# Patient Record
Sex: FEMALE | Race: WHITE | NOT HISPANIC OR LATINO | Employment: FULL TIME | URBAN - METROPOLITAN AREA
[De-identification: names, ages, dates, MRNs, and addresses within clinical notes are randomized per-mention and may not be internally consistent; named-entity substitution may affect disease eponyms.]

---

## 2018-02-20 RX ORDER — ATORVASTATIN CALCIUM 20 MG/1
20 TABLET, FILM COATED ORAL
COMMUNITY
Start: 2018-01-22

## 2018-02-20 RX ORDER — BIOTIN 1 MG
1000 TABLET ORAL
COMMUNITY
Start: 2012-07-18

## 2018-02-20 RX ORDER — ALPRAZOLAM 0.25 MG/1
0.25 TABLET ORAL
COMMUNITY
Start: 2015-11-05

## 2018-02-20 RX ORDER — BALSALAZIDE DISODIUM 750 MG/1
2250 CAPSULE ORAL
COMMUNITY
End: 2021-10-06

## 2018-02-20 RX ORDER — ACETAMINOPHEN 500 MG
500 TABLET ORAL EVERY 6 HOURS
COMMUNITY

## 2018-10-01 ENCOUNTER — OFFICE VISIT (OUTPATIENT)
Dept: PODIATRY | Facility: CLINIC | Age: 58
End: 2018-10-01
Payer: COMMERCIAL

## 2018-10-01 VITALS
BODY MASS INDEX: 26.63 KG/M2 | RESPIRATION RATE: 16 BRPM | SYSTOLIC BLOOD PRESSURE: 134 MMHG | DIASTOLIC BLOOD PRESSURE: 74 MMHG | HEART RATE: 74 BPM | WEIGHT: 156 LBS | HEIGHT: 64 IN

## 2018-10-01 DIAGNOSIS — M79.672 PAIN IN BOTH FEET: Primary | ICD-10-CM

## 2018-10-01 DIAGNOSIS — Q66.52 CONGENITAL PES PLANUS OF LEFT FOOT: ICD-10-CM

## 2018-10-01 DIAGNOSIS — M21.969 ACQUIRED DEFORMITY OF FOOT, UNSPECIFIED LATERALITY: ICD-10-CM

## 2018-10-01 DIAGNOSIS — Q66.51 CONGENITAL PES PLANUS OF RIGHT FOOT: ICD-10-CM

## 2018-10-01 DIAGNOSIS — M79.671 PAIN IN BOTH FEET: Primary | ICD-10-CM

## 2018-10-01 PROCEDURE — 73620 X-RAY EXAM OF FOOT: CPT | Performed by: PODIATRIST

## 2018-10-01 PROCEDURE — 20550 NJX 1 TENDON SHEATH/LIGAMENT: CPT | Performed by: PODIATRIST

## 2018-10-01 PROCEDURE — 99203 OFFICE O/P NEW LOW 30 MIN: CPT | Performed by: PODIATRIST

## 2018-10-01 RX ORDER — LOSARTAN POTASSIUM AND HYDROCHLOROTHIAZIDE 12.5; 1 MG/1; MG/1
1 TABLET ORAL
COMMUNITY
Start: 2018-08-02 | End: 2019-08-02

## 2018-10-01 RX ORDER — METOPROLOL TARTRATE 50 MG/1
50 TABLET, FILM COATED ORAL
COMMUNITY
Start: 2018-01-04 | End: 2022-05-31

## 2018-10-01 RX ORDER — GUAIFENESIN 1200 MG/1
TABLET, EXTENDED RELEASE ORAL
COMMUNITY
Start: 2014-11-19

## 2018-10-01 NOTE — PROGRESS NOTES
Assessment/Plan:   Pain  Plantar fasciitis  Pes planus  Plan  X-rays taken  Foot exam performed  Right heel trigger point injection done  1 25 cc of Kenalog 10 was injected without pain or complication  Patient's feet have been casted for custom molded foot orthotics  She will take Tylenol p r n  Gerhardt Sep She will stretch daily  There are no diagnoses linked to this encounter  Subjective:  Patient has complaint of pain in her right heel  No history of trauma  This has been ongoing for several months  Patient ID: Margo Sullivan is a 62 y o  female  No past medical history on file  No past surgical history on file  Allergies   Allergen Reactions    Adhesive [Medical Tape]          Current Outpatient Prescriptions:     Cholecalciferol 1000 units capsule, Take 1,000 Units by mouth, Disp: , Rfl:     Guaifenesin 1200 MG TB12, Take as needed , Disp: , Rfl:     losartan-hydrochlorothiazide (HYZAAR) 100-12 5 MG per tablet, Take 1 tablet by mouth, Disp: , Rfl:     metoprolol tartrate (LOPRESSOR) 50 mg tablet, Take 50 mg by mouth, Disp: , Rfl:     acetaminophen (TYLENOL) 500 mg tablet, Take 500 mg by mouth every 6 (six) hours, Disp: , Rfl:     ALPRAZolam (XANAX) 0 25 mg tablet, Take 0 25 mg by mouth, Disp: , Rfl:     atorvastatin (LIPITOR) 20 mg tablet, Take 20 mg by mouth, Disp: , Rfl:     B Complex Vitamins (B COMPLEX 1 PO), Take 1 tablet by mouth, Disp: , Rfl:     balsalazide (COLAZAL) 750 mg capsule, Take 2,250 mg by mouth, Disp: , Rfl:     Biotin 1000 MCG tablet, Take 1,000 mcg by mouth, Disp: , Rfl:     There is no problem list on file for this patient  HPI    The following portions of the patient's history were reviewed and updated as appropriate: allergies, current medications, past family history, past medical history, past social history, past surgical history and problem list     Review of Systems      Historical Information   No past medical history on file    No past surgical history on file  Social History   History   Alcohol use Not on file     History   Drug use: Unknown     Family History: No family history on file  Meds/Allergies   Allergies   Allergen Reactions    Adhesive [Medical Tape]        Current Outpatient Prescriptions on File Prior to Visit   Medication Sig Dispense Refill    acetaminophen (TYLENOL) 500 mg tablet Take 500 mg by mouth every 6 (six) hours      ALPRAZolam (XANAX) 0 25 mg tablet Take 0 25 mg by mouth      atorvastatin (LIPITOR) 20 mg tablet Take 20 mg by mouth      balsalazide (COLAZAL) 750 mg capsule Take 2,250 mg by mouth      Biotin 1000 MCG tablet Take 1,000 mcg by mouth       No current facility-administered medications on file prior to visit  Objective:  Patient's shoes and socks removed     Foot Exam    General  General Appearance: appears stated age and healthy   Orientation: alert and oriented to person, place, and time   Affect: appropriate   Gait: antalgic       Right Foot/Ankle     Inspection and Palpation  Ecchymosis: none  Tenderness: bony tenderness   Swelling: metatarsals   Arch: pes planus  Hammertoes: fifth toe  Hallux valgus: no  Hallux limitus: yes  Skin Exam: dry skin;     Neurovascular  Dorsalis pedis: 2+  Posterior tibial: 2+  Saphenous nerve sensation: normal  Tibial nerve sensation: normal  Superficial peroneal nerve sensation: normal  Deep peroneal nerve sensation: normal  Sural nerve sensation: normal  Achilles reflex: 2+  Babinski reflex: 2+    Muscle Strength  Ankle dorsiflexion: 4+    Range of Motion    Passive  Ankle dorsiflexion: 5, pain      Tests  PT Tinel's sign: negative    Too many toes: positive       Left Foot/Ankle      Inspection and Palpation  Tenderness: none   Swelling: metatarsals   Arch: pes planus  Hammertoes: fifth toe  Hallux valgus: no  Hallux limitus: yes    Neurovascular  Dorsalis pedis: 2+  Posterior tibial: 2+  Saphenous nerve sensation: normal  Tibial nerve sensation: normal  Superficial peroneal nerve sensation: normal  Deep peroneal nerve sensation: normal  Sural nerve sensation: normal  Achilles reflex: 2+  Babinski reflex: 2+    Muscle Strength  Ankle dorsiflexion: 4+    Range of Motion    Passive  Ankle dorsiflexion: 5      Tests  PT Tinel's sign: negative  Too many toes: positive         Physical Exam   Constitutional: She appears well-developed and well-nourished  Cardiovascular: Normal rate and regular rhythm  Pulses:       Dorsalis pedis pulses are 2+ on the right side, and 2+ on the left side  Posterior tibial pulses are 2+ on the right side, and 2+ on the left side  Musculoskeletal:        Right foot: There is bony tenderness  Pain with palpation plantar fascia insertion  X-ray demonstrates no evidence of fracture however early plantar calcaneal spur noted   Feet:   Right Foot:   Skin Integrity: Positive for dry skin  Neurological:   Reflex Scores:       Achilles reflexes are 2+ on the right side and 2+ on the left side

## 2019-06-13 ENCOUNTER — ANNUAL EXAM (OUTPATIENT)
Dept: OBGYN CLINIC | Facility: CLINIC | Age: 59
End: 2019-06-13
Payer: COMMERCIAL

## 2019-06-13 VITALS
DIASTOLIC BLOOD PRESSURE: 80 MMHG | HEIGHT: 64 IN | WEIGHT: 158 LBS | BODY MASS INDEX: 26.98 KG/M2 | SYSTOLIC BLOOD PRESSURE: 124 MMHG

## 2019-06-13 DIAGNOSIS — Z12.39 BREAST SCREENING, UNSPECIFIED: ICD-10-CM

## 2019-06-13 DIAGNOSIS — Z01.419 ENCOUNTER FOR WELL WOMAN EXAM: Primary | ICD-10-CM

## 2019-06-13 PROCEDURE — 99396 PREV VISIT EST AGE 40-64: CPT | Performed by: PHYSICIAN ASSISTANT

## 2019-06-13 RX ORDER — CALCIUM CARBONATE 200(500)MG
1 TABLET,CHEWABLE ORAL DAILY
COMMUNITY

## 2019-06-13 RX ORDER — DILTIAZEM HYDROCHLORIDE 180 MG/1
180 CAPSULE, COATED, EXTENDED RELEASE ORAL DAILY
COMMUNITY

## 2019-06-13 RX ORDER — FAMOTIDINE 10 MG
10 TABLET ORAL 2 TIMES DAILY
COMMUNITY

## 2019-06-13 RX ORDER — PHENOL 1.4 %
600 AEROSOL, SPRAY (ML) MUCOUS MEMBRANE 2 TIMES DAILY WITH MEALS
COMMUNITY

## 2019-06-13 RX ORDER — ASPIRIN 81 MG/1
81 TABLET ORAL DAILY
COMMUNITY

## 2019-06-13 RX ORDER — CHLORAL HYDRATE 500 MG
CAPSULE ORAL
COMMUNITY

## 2019-06-13 RX ORDER — DIPHENOXYLATE HYDROCHLORIDE AND ATROPINE SULFATE 2.5; .025 MG/1; MG/1
1 TABLET ORAL DAILY
COMMUNITY

## 2019-06-13 RX ORDER — LORATADINE 10 MG/1
10 TABLET ORAL DAILY
COMMUNITY

## 2019-11-25 ENCOUNTER — OFFICE VISIT (OUTPATIENT)
Dept: OBGYN CLINIC | Facility: CLINIC | Age: 59
End: 2019-11-25
Payer: COMMERCIAL

## 2019-11-25 VITALS — SYSTOLIC BLOOD PRESSURE: 118 MMHG | WEIGHT: 155 LBS | DIASTOLIC BLOOD PRESSURE: 80 MMHG | BODY MASS INDEX: 26.61 KG/M2

## 2019-11-25 DIAGNOSIS — R30.9 PAIN WITH URINATION: Primary | ICD-10-CM

## 2019-11-25 DIAGNOSIS — N30.00 ACUTE CYSTITIS WITHOUT HEMATURIA: ICD-10-CM

## 2019-11-25 LAB
SL AMB  POCT GLUCOSE, UA: NEGATIVE
SL AMB LEUKOCYTE ESTERASE,UA: ABNORMAL
SL AMB POCT BILIRUBIN,UA: NEGATIVE
SL AMB POCT BLOOD,UA: ABNORMAL
SL AMB POCT KETONES,UA: NEGATIVE
SL AMB POCT NITRITE,UA: POSITIVE
SL AMB POCT PH,UA: NEGATIVE
SL AMB POCT SPECIFIC GRAVITY,UA: NEGATIVE
SL AMB POCT URINE PROTEIN: NEGATIVE
SL AMB POCT UROBILINOGEN: NEGATIVE

## 2019-11-25 PROCEDURE — 99213 OFFICE O/P EST LOW 20 MIN: CPT | Performed by: PHYSICIAN ASSISTANT

## 2019-11-25 PROCEDURE — 81002 URINALYSIS NONAUTO W/O SCOPE: CPT | Performed by: PHYSICIAN ASSISTANT

## 2019-11-25 RX ORDER — NITROFURANTOIN 25; 75 MG/1; MG/1
100 CAPSULE ORAL 2 TIMES DAILY
Qty: 14 CAPSULE | Refills: 1 | Status: SHIPPED | OUTPATIENT
Start: 2019-11-25 | End: 2019-12-02

## 2019-11-25 RX ORDER — POTASSIUM CHLORIDE 750 MG/1
30 TABLET, EXTENDED RELEASE ORAL 2 TIMES DAILY
COMMUNITY

## 2019-11-25 NOTE — PROGRESS NOTES
Assessment/Plan:    No problem-specific Assessment & Plan notes found for this encounter  Diagnoses and all orders for this visit:    Pain with urination  -     POCT urine dip  -     nitrofurantoin (MACROBID) 100 mg capsule; Take 1 capsule (100 mg total) by mouth 2 (two) times a day for 7 days    Acute cystitis without hematuria  -     nitrofurantoin (MACROBID) 100 mg capsule; Take 1 capsule (100 mg total) by mouth 2 (two) times a day for 7 days    Other orders  -     potassium chloride (K-DUR,KLOR-CON) 10 mEq tablet; Take 30 mEq by mouth 2 (two) times a day          Subjective:      Patient ID: Akbar Ochoa is a 61 y o  female  Pt presents for a problem visit today  She complains of urinary frequency and burning off and on x 2 weeks  Pt has h/o IC--changed diet and sxs impoved, but not 100%  No fever/chills  No hematuria  Some lbp, right >left    UA+  rx macrobid  Increase water, probiotic, cranberry      The following portions of the patient's history were reviewed and updated as appropriate: allergies, current medications, past family history, past medical history, past social history, past surgical history and problem list     Review of Systems   Constitutional: Negative for chills, fever and unexpected weight change  Gastrointestinal: Negative for abdominal pain, blood in stool, constipation and diarrhea  Genitourinary: Positive for dysuria and frequency  Negative for hematuria  Objective:      /80 (BP Location: Right arm, Patient Position: Sitting, Cuff Size: Standard)   Wt 70 3 kg (155 lb)   BMI 26 61 kg/m²          Physical Exam   Constitutional: She appears well-developed and well-nourished  Genitourinary: Vagina normal  Pelvic exam was performed with patient supine  There is no rash or lesion on the right labia  There is no rash or lesion on the left labia  Cervix exhibits no motion tenderness, no discharge and no friability     Lymphadenopathy: No inguinal adenopathy noted on the right or left side  Nursing note and vitals reviewed

## 2019-11-25 NOTE — PROGRESS NOTES
Patient is here with pain with urinating, urgency and frequency  Patient has seen Uro/GYN and was diagnosed with cystitis  She states she is also having very bad kidney pain  She states she has had a history of UTI symptoms, and last saw Dino Nuñez in March  She is also concerned that she has a prolapse, she did have hysterectomy in 2003, but feels pressure

## 2020-06-17 ENCOUNTER — ANNUAL EXAM (OUTPATIENT)
Dept: OBGYN CLINIC | Facility: CLINIC | Age: 60
End: 2020-06-17
Payer: COMMERCIAL

## 2020-06-17 VITALS
WEIGHT: 156 LBS | BODY MASS INDEX: 26.63 KG/M2 | SYSTOLIC BLOOD PRESSURE: 118 MMHG | DIASTOLIC BLOOD PRESSURE: 80 MMHG | HEIGHT: 64 IN

## 2020-06-17 DIAGNOSIS — Z12.31 ENCOUNTER FOR SCREENING MAMMOGRAM FOR BREAST CANCER: ICD-10-CM

## 2020-06-17 DIAGNOSIS — Z78.0 POSTMENOPAUSAL: ICD-10-CM

## 2020-06-17 DIAGNOSIS — Z01.419 ENCOUNTER FOR WELL WOMAN EXAM: Primary | ICD-10-CM

## 2020-06-17 DIAGNOSIS — Z12.39 ENCOUNTER FOR SCREENING BREAST EXAMINATION: ICD-10-CM

## 2020-06-17 PROCEDURE — 99396 PREV VISIT EST AGE 40-64: CPT | Performed by: PHYSICIAN ASSISTANT

## 2021-02-16 DIAGNOSIS — K51.919 ULCERATIVE COLITIS WITH COMPLICATION, UNSPECIFIED LOCATION (HCC): Primary | ICD-10-CM

## 2021-02-16 RX ORDER — BALSALAZIDE DISODIUM 750 MG/1
2250 CAPSULE ORAL 3 TIMES DAILY
Qty: 270 CAPSULE | Refills: 5 | Status: SHIPPED | OUTPATIENT
Start: 2021-02-16 | End: 2021-02-19

## 2021-02-16 RX ORDER — BALSALAZIDE DISODIUM 750 MG/1
2250 CAPSULE ORAL 3 TIMES DAILY
COMMUNITY
End: 2021-02-16 | Stop reason: SDUPTHER

## 2021-02-16 NOTE — TELEPHONE ENCOUNTER
Pt requesting refill on balsalazide disodium 750mg caps  Last office visit was 12-  Please advise   Thank you

## 2021-02-19 DIAGNOSIS — K51.90 ULCERATIVE COLITIS WITHOUT COMPLICATIONS, UNSPECIFIED LOCATION (HCC): Primary | ICD-10-CM

## 2021-02-19 DIAGNOSIS — K51.919 ULCERATIVE COLITIS WITH COMPLICATION, UNSPECIFIED LOCATION (HCC): ICD-10-CM

## 2021-02-19 RX ORDER — BALSALAZIDE DISODIUM 750 MG/1
2250 CAPSULE ORAL 2 TIMES DAILY
Qty: 180 CAPSULE | Refills: 5 | Status: SHIPPED | OUTPATIENT
Start: 2021-02-19 | End: 2021-02-22

## 2021-02-19 RX ORDER — BALSALAZIDE DISODIUM 750 MG/1
CAPSULE ORAL
Qty: 180 CAPSULE | Refills: 2 | OUTPATIENT
Start: 2021-02-19

## 2021-02-22 RX ORDER — BALSALAZIDE DISODIUM 750 MG/1
2250 CAPSULE ORAL 2 TIMES DAILY
Qty: 180 CAPSULE | Refills: 5 | Status: SHIPPED | OUTPATIENT
Start: 2021-02-22 | End: 2021-10-06 | Stop reason: SDUPTHER

## 2021-02-22 NOTE — TELEPHONE ENCOUNTER
Patient left message that you sent the prescription to the wrong pharmacy  [facility]   Thank you

## 2021-03-10 DIAGNOSIS — Z23 ENCOUNTER FOR IMMUNIZATION: ICD-10-CM

## 2021-04-19 ENCOUNTER — TELEPHONE (OUTPATIENT)
Dept: GASTROENTEROLOGY | Facility: CLINIC | Age: 61
End: 2021-04-19

## 2021-04-19 NOTE — TELEPHONE ENCOUNTER
Patient had left message asking for a call to schedule her colonoscopy  I returned her call and had to leave message for her to call back to actually schedule a flip with Dr Pablo Do  He spoke with her in December and she was having issues with gerd and told her she would have an EGD along with her colon when she was due  If she calls back, she has a hx of gerd and ulcerative colitis  Please schedule for a flip with Dr Pablo Do  Thank you!

## 2021-04-20 ENCOUNTER — TELEPHONE (OUTPATIENT)
Dept: GASTROENTEROLOGY | Facility: CLINIC | Age: 61
End: 2021-04-20

## 2021-04-20 NOTE — TELEPHONE ENCOUNTER
Faxed cardiac clearance request to Dr Sohail Dietrich [phone] due to pt having a hx of afib  Will call their office in one week to make sure that clearance request was received [phone]

## 2021-04-23 NOTE — TELEPHONE ENCOUNTER
I spoke to patient and answered all questions regarding upcoming procedure  Flip is scheduled   Thank you

## 2021-05-13 ENCOUNTER — TELEPHONE (OUTPATIENT)
Dept: GASTROENTEROLOGY | Facility: AMBULATORY SURGERY CENTER | Age: 61
End: 2021-05-13

## 2021-05-13 NOTE — TELEPHONE ENCOUNTER
Dr Marquise Mann    I spoke to patient today and she was unsure if she wants the EGD  Can you please review her chart  She said she doesn't recall having a gastric ulcer and she only uses tums maybe once a week if needed    She would like your approval to not have EGD and only the colon    Thanks you Whitney león

## 2021-05-14 RX ORDER — LOSARTAN POTASSIUM AND HYDROCHLOROTHIAZIDE 12.5; 1 MG/1; MG/1
TABLET ORAL
COMMUNITY
Start: 2021-03-08

## 2021-05-14 RX ORDER — UBIDECARENONE 75 MG
CAPSULE ORAL DAILY
COMMUNITY

## 2021-05-17 ENCOUNTER — ANESTHESIA (OUTPATIENT)
Dept: GASTROENTEROLOGY | Facility: AMBULATORY SURGERY CENTER | Age: 61
End: 2021-05-17

## 2021-05-17 ENCOUNTER — ANESTHESIA EVENT (OUTPATIENT)
Dept: GASTROENTEROLOGY | Facility: AMBULATORY SURGERY CENTER | Age: 61
End: 2021-05-17

## 2021-05-17 ENCOUNTER — HOSPITAL ENCOUNTER (OUTPATIENT)
Dept: GASTROENTEROLOGY | Facility: AMBULATORY SURGERY CENTER | Age: 61
Discharge: HOME | End: 2021-05-17
Payer: COMMERCIAL

## 2021-05-17 VITALS
TEMPERATURE: 97.2 F | RESPIRATION RATE: 18 BRPM | WEIGHT: 157 LBS | HEART RATE: 65 BPM | HEIGHT: 64 IN | DIASTOLIC BLOOD PRESSURE: 79 MMHG | BODY MASS INDEX: 26.8 KG/M2 | OXYGEN SATURATION: 100 % | SYSTOLIC BLOOD PRESSURE: 136 MMHG

## 2021-05-17 DIAGNOSIS — K51.919 ULCERATIVE COLITIS WITH COMPLICATION, UNSPECIFIED LOCATION (HCC): ICD-10-CM

## 2021-05-17 PROCEDURE — 00811 ANES LWR INTST NDSC NOS: CPT | Performed by: NURSE ANESTHETIST, CERTIFIED REGISTERED

## 2021-05-17 PROCEDURE — 45385 COLONOSCOPY W/LESION REMOVAL: CPT | Performed by: INTERNAL MEDICINE

## 2021-05-17 PROCEDURE — 88305 TISSUE EXAM BY PATHOLOGIST: CPT | Performed by: PATHOLOGY

## 2021-05-17 PROCEDURE — 45380 COLONOSCOPY AND BIOPSY: CPT | Performed by: INTERNAL MEDICINE

## 2021-05-17 RX ORDER — PROPOFOL 10 MG/ML
INJECTION, EMULSION INTRAVENOUS AS NEEDED
Status: DISCONTINUED | OUTPATIENT
Start: 2021-05-17 | End: 2021-05-17

## 2021-05-17 RX ORDER — LANOLIN ALCOHOL/MO/W.PET/CERES
CREAM (GRAM) TOPICAL
COMMUNITY

## 2021-05-17 RX ORDER — SODIUM CHLORIDE 9 MG/ML
30 INJECTION, SOLUTION INTRAVENOUS CONTINUOUS
Status: DISCONTINUED | OUTPATIENT
Start: 2021-05-17 | End: 2021-05-21 | Stop reason: HOSPADM

## 2021-05-17 RX ADMIN — PROPOFOL 100 MG: 10 INJECTION, EMULSION INTRAVENOUS at 10:46

## 2021-05-17 RX ADMIN — PROPOFOL 50 MG: 10 INJECTION, EMULSION INTRAVENOUS at 10:49

## 2021-05-17 RX ADMIN — SODIUM CHLORIDE: 9 INJECTION, SOLUTION INTRAVENOUS at 10:39

## 2021-05-17 RX ADMIN — PROPOFOL 50 MG: 10 INJECTION, EMULSION INTRAVENOUS at 10:53

## 2021-05-17 RX ADMIN — PROPOFOL 50 MG: 10 INJECTION, EMULSION INTRAVENOUS at 10:48

## 2021-05-17 RX ADMIN — PROPOFOL 30 MG: 10 INJECTION, EMULSION INTRAVENOUS at 10:59

## 2021-05-17 NOTE — DISCHARGE INSTRUCTIONS
Colonoscopy   WHAT YOU NEED TO KNOW:   A colonoscopy is a procedure to examine the inside of your colon (intestine) with a scope  Polyps or tissue growths may have been removed during your colonoscopy  It is normal to feel bloated and to have some abdominal discomfort  You should be passing gas  If you have hemorrhoids or you had polyps removed, you may have a small amount of bleeding  DISCHARGE INSTRUCTIONS:   Seek care immediately if:    You have sudden, severe abdominal pain   You have problems swallowing   You have a large amount of black, sticky bowel movements or blood in your bowel movements   You have sudden trouble breathing   You feel weak, lightheaded, or faint or your heart beats faster than normal for you  Contact your healthcare provider if:    You have a fever and chills   You have nausea or are vomiting   Your abdomen is bloated or feels full and hard   You have abdominal pain   You have black, sticky bowel movements or blood in your bowel movements   You have not had a bowel movement for 3 days after your procedure   You have rash or hives   You have questions or concerns about your procedure  Activity:    Do not lift, strain, or run for 24 hours after your procedure   Rest after your procedure  You have been given medicine to relax you  Do not drive or make important decisions until the day after your procedure  Return to your normal activity as directed   Relieve gas and discomfort from bloating by lying on your right side with a heating pad on your abdomen  You may need to take short walks to help the gas move out  Eat small meals until bloating is relieved  Follow up with your healthcare provider as directed: Write down your questions so you remember to ask them during your visits  If you take a blood thinner, please review the specific instructions from your endoscopist about when you should resume it   These can be found in the Recommendation and Your Medication list sections of this After Visit Summary  High Fiber Diet   WHAT YOU NEED TO KNOW:   What is a high-fiber diet? A high-fiber diet includes foods that have a high amount of fiber  Fiber is the part of fruits, vegetables, and grains that is not broken down by your body  Fiber keeps your bowel movements regular  Fiber can also help lower your cholesterol level, control blood sugar in people with diabetes, and relieve constipation  Fiber can also help you control your weight because it helps you feel full faster  Most adults should eat 25 to 35 grams of fiber each day  Talk to your dietitian or healthcare provider about the amount of fiber you need  What foods are good sources of fiber? · Foods with at least 4 grams of fiber per serving:      ? ? to ½ cup of high-fiber cereal (check the nutrition label on the box)    ? ½ cup of blackberries or raspberries    ? 4 dried prunes    ? 1 cooked artichoke    ? ½ cup of cooked legumes, such as lentils, or red, kidney, and galvan beans    · Foods with 1 to 3 grams of fiber per serving:      ? 1 slice of whole-wheat, pumpernickel, or rye bread    ? ½ cup of cooked brown rice    ? 4 whole-wheat crackers    ? 1 cup of oatmeal    ? ½ cup of cereal with 1 to 3 grams of fiber per serving (check the nutrition label on the box)    ? 1 small piece of fruit, such as an apple, banana, pear, kiwi, or orange    ? 3 dates    ? ½ cup of canned apricots, fruit cocktail, peaches, or pears    ? ½ cup of raw or cooked vegetables, such as carrots, cauliflower, cabbage, spinach, squash, or corn  What are some ways that I can increase fiber in my diet? · Choose brown or wild rice instead of white rice  · Use whole wheat flour in recipes instead of white or all-purpose flour  · Add beans and peas to casseroles or soups  · Choose fresh fruit and vegetables with peels or skins on instead of juices      What other guidelines should I follow? · Add fiber to your diet slowly  You may have abdominal discomfort, bloating, and gas if you add fiber to your diet too quickly  · Drink plenty of liquids as you add fiber to your diet  You may have nausea or develop constipation if you do not drink enough water  Ask how much liquid to drink each day and which liquids are best for you  CARE AGREEMENT:   You have the right to help plan your care  Discuss treatment options with your healthcare provider to decide what care you want to receive  You always have the right to refuse treatment  The above information is an  only  It is not intended as medical advice for individual conditions or treatments  Talk to your doctor, nurse or pharmacist before following any medical regimen to see if it is safe and effective for you  © Copyright [address] Information is for End User's use only and may not be sold, redistributed or otherwise used for commercial purposes  All illustrations and images included in CareNotes® are the copyrighted property of A D A M , Inc  or [address] [place] [address]  Colorectal Polyps   WHAT YOU NEED TO KNOW:   Colorectal polyps are small growths of tissue in the lining of the colon and rectum  Most polyps are hyperplastic polyps and are usually benign (noncancerous)  Certain types of polyps, called adenomatous polyps, may turn into cancer  DISCHARGE INSTRUCTIONS:   Follow up with your healthcare provider or gastroenterologist as directed: You may need to return for more tests, such as another colonoscopy  Write down your questions so you remember to ask them during your visits  Reduce your risk for colorectal polyps:   · Eat a variety of healthy foods:  Healthy foods include fruit, vegetables, whole-grain breads, low-fat dairy products, beans, lean meat, and fish  Ask if you need to be on a special diet      · Maintain a healthy weight:  Ask your healthcare provider if you need to lose weight and how much you need to lose  Ask for help with a weight loss program     · Exercise:  Begin to exercise slowly and do more as you get stronger  Talk with your healthcare provider before you start an exercise program      · Limit alcohol:  Your risk for polyps increases the more you drink  · Do not smoke: If you smoke, it is never too late to quit  Ask for information about how to stop  For support and more information:   · Anillouannana Young ([facility])  [address] [place], [place] [address]  Phone: 1- 359 - [phone]  Web Address: www digestive  niddk nih gov    Contact your healthcare provider or gastroenterologist if:   · You have a fever  · You have chills, a cough, or feel weak and achy  · You have abdominal pain that does not go away or gets worse after you take medicine  · Your abdomen is swollen  · You are losing weight without trying  · You have questions or concerns about your condition or care  Seek care immediately or call 911 if:   · You have sudden shortness of breath  · You have a fast heart rate, fast breathing, or are too dizzy to stand up  · You have severe abdominal pain  · You see blood in your bowel movement  © Copyright [address] Information is for End User's use only and may not be sold, redistributed or otherwise used for commercial purposes  All illustrations and images included in CareNotes® are the copyrighted property of [employer]  or Charo Oseguera  The above information is an  only  It is not intended as medical advice for individual conditions or treatments  Talk to your doctor, nurse or pharmacist before following any medical regimen to see if it is safe and effective for you

## 2021-05-17 NOTE — DISCHARGE SUMMARY
Discharge Summary - Jennifer Blas 61 y o  female MRN: [id number]    Unit/Bed#:  Encounter: [id number]    Admission Date:  05/17/2021    Admitting Diagnosis: Ulcerative colitis with complication, unspecified location [facility]) [[id number]]    HPI:  Patient has longstanding history of ulcerative colitis of more than 20 years  Screening colonoscopy is being done for high risk of colon cancer    Procedures Performed: No orders of the defined types were placed in this encounter  Summary of Hospital Course:  Colonoscopy was done and patient tolerated procedure well  Significant Findings, Care, Treatment and Services Provided:  Colon polyp removed  Random biopsies taken  Complications:  None    Discharge Diagnosis:  See above    Resolved Problems  Date Reviewed: 6/17/2020    None          Condition at Discharge: good         Discharge instructions/Information to patient and family:   See after visit summary for information provided to patient and family  Provisions for Follow-Up Care:  See after visit summary for information related to follow-up care and any pertinent home health orders  PCP: No primary care provider on file      Disposition: Home

## 2021-05-17 NOTE — H&P
History and Physical - [facility]  Tyra Tam 61 y o  female MRN: [id number]                  HPI: Tyra Tam is a 61y o  year old female who presents for history of longstanding ulcerative colitis for more than 20 years  Surveillance colonoscopy is being done  REVIEW OF SYSTEMS: Per the HPI, and otherwise unremarkable  Historical Information   Past Medical History:   Diagnosis Date    Anxiety     Colitis, chronic, ulcerative, unspecified complication (HCC)     Colon polyp     GERD (gastroesophageal reflux disease)     Hypertension     Irregular heart beat     a fib  had cardioversion 2008  resolved     Past Surgical History:   Procedure Laterality Date    COLONOSCOPY      HYSTERECTOMY      OVARIAN CYST REMOVAL      WISDOM TOOTH EXTRACTION       Social History   Social History     Substance and Sexual Activity   Alcohol Use Yes    Frequency: 4 or more times a week    Drinks per session: 1 or 2    Binge frequency: Never    Comment: occ     Social History     Substance and Sexual Activity   Drug Use Never     Social History     Tobacco Use   Smoking Status Former Smoker    Packs/day: 1 00    Years: 5 00    Pack years: 5 00    Types: Cigarettes   Smokeless Tobacco Never Used     History reviewed  No pertinent family history      Meds/Allergies       Current Outpatient Medications:     aspirin (ECOTRIN LOW STRENGTH) 81 mg EC tablet    atorvastatin (LIPITOR) 20 mg tablet    B Complex Vitamins (B COMPLEX 1 PO)    balsalazide (COLAZAL) 750 mg capsule    Biotin 1000 MCG tablet    Cholecalciferol 1000 units capsule    cyanocobalamin (VITAMIN B-12) 100 mcg tablet    diltiazem (CARDIZEM CD) 180 mg 24 hr capsule    Guaifenesin 1200 MG TB12    losartan-hydrochlorothiazide (HYZAAR) 100-12 5 MG per tablet    melatonin 3 mg    metoprolol tartrate (LOPRESSOR) 50 mg tablet    multivitamin (THERAGRAN) TABS    Omega-3 Fatty Acids (OMEGA-3 FISH OIL) 1000 MG CAPS   potassium chloride (K-DUR,KLOR-CON) 10 mEq tablet    Probiotic Product (PROBIOTIC-10 PO)    acetaminophen (TYLENOL) 500 mg tablet    ALPRAZolam (XANAX) 0 25 mg tablet    balsalazide (COLAZAL) 750 mg capsule    calcium carbonate (OS-JESSIE) 600 MG tablet    calcium carbonate (TUMS) 500 mg chewable tablet    Cranberry 1000 MG CAPS    famotidine (PEPCID) 10 mg tablet    loratadine (CLARITIN) 10 mg tablet    Current Facility-Administered Medications:     sodium chloride 0 9 % infusion, 30 mL/hr, Intravenous, Continuous, New Bag at 05/17/21 1039    No Known Allergies    Objective     /77   Pulse 68   Temp (!) 97 2 °F (36 2 °C) (Temporal)   Resp 18   Ht 5' 4" (1 626 m)   Wt 71 2 kg (157 lb)   SpO2 98%   BMI 26 95 kg/m²       PHYSICAL EXAM    Gen: NAD  Head: NCAT  CV: RRR  CHEST: Clear  ABD: soft, NT/ND  EXT: no edema      ASSESSMENT/PLAN:  This is a 61y o  year old female here for screening colonoscopy, and she is stable and optimized for her procedure

## 2021-06-22 ENCOUNTER — ANNUAL EXAM (OUTPATIENT)
Dept: OBGYN CLINIC | Facility: CLINIC | Age: 61
End: 2021-06-22
Payer: COMMERCIAL

## 2021-06-22 VITALS
HEIGHT: 64 IN | BODY MASS INDEX: 27.66 KG/M2 | DIASTOLIC BLOOD PRESSURE: 80 MMHG | SYSTOLIC BLOOD PRESSURE: 124 MMHG | WEIGHT: 162 LBS

## 2021-06-22 DIAGNOSIS — Z01.419 ENCOUNTER FOR WELL WOMAN EXAM: Primary | ICD-10-CM

## 2021-06-22 DIAGNOSIS — Z12.31 ENCOUNTER FOR SCREENING MAMMOGRAM FOR BREAST CANCER: ICD-10-CM

## 2021-06-22 DIAGNOSIS — Z12.39 ENCOUNTER FOR SCREENING BREAST EXAMINATION: ICD-10-CM

## 2021-06-22 PROCEDURE — 99396 PREV VISIT EST AGE 40-64: CPT | Performed by: PHYSICIAN ASSISTANT

## 2021-06-22 RX ORDER — CETIRIZINE HYDROCHLORIDE 10 MG/1
10 TABLET, CHEWABLE ORAL DAILY
COMMUNITY

## 2021-06-22 RX ORDER — CEPHALEXIN 500 MG/1
500 CAPSULE ORAL EVERY 6 HOURS SCHEDULED
COMMUNITY

## 2021-06-22 NOTE — PROGRESS NOTES
Assessment/Plan:    No problem-specific Assessment & Plan notes found for this encounter  Diagnoses and all orders for this visit:    Encounter for well woman exam    Encounter for screening breast examination    Encounter for screening mammogram for breast cancer  -     Mammo screening bilateral w 3d & cad; Future    Other orders  -     cetirizine (ZyrTEC) 10 MG chewable tablet; Chew 10 mg daily  -     cephalexin (KEFLEX) 500 mg capsule; Take 500 mg by mouth every 6 (six) hours          Subjective:      Patient ID: Rashad Quevedo is a 64 y o  female  Pt presents for her annual exam today--  She has no complaints  No changes  Taking abx for uti right now from Er visit last week  She has no bleeding or pelvic pain--vag hyst  Bowel and bladder are regular  [facility]--5/2021[facility]  No breast concerns today  Last mammo--  6/2020      No pap today  rx mammo  Hydrate  Cranberry tabs      The following portions of the patient's history were reviewed and updated as appropriate: allergies, current medications, past family history, past medical history, past social history, past surgical history and problem list     Review of Systems   Constitutional: Negative for chills, fever and unexpected weight change  Gastrointestinal: Negative for abdominal pain, blood in stool, constipation and diarrhea  Genitourinary: Negative  Objective:      /80   Ht 5' 4" (1 626 m)   Wt 73 5 kg (162 lb)   BMI 27 81 kg/m²          Physical Exam  Vitals and nursing note reviewed  Constitutional:       Appearance: She is well-developed  HENT:      Head: Normocephalic and atraumatic  Chest:      Breasts:         Right: No inverted nipple, mass, nipple discharge or skin change  Left: No inverted nipple, mass, nipple discharge or skin change  Abdominal:      Palpations: Abdomen is soft  Genitourinary:     Exam position: Supine  Labia:         Right: No rash, tenderness or lesion  Left: No rash, tenderness or lesion  Vagina: Normal       Cervix: No cervical motion tenderness, discharge or friability  Adnexa:         Right: No mass, tenderness or fullness  Left: No mass, tenderness or fullness  Musculoskeletal:      Cervical back: Normal range of motion  Lymphadenopathy:      Lower Body: No right inguinal adenopathy  No left inguinal adenopathy

## 2021-06-22 NOTE — PROGRESS NOTES
Patient is here for yearly exam  Patient has no bleeding or pelvic pain  Patient was in the ER 6/14 with high blood pressure  Patient urine also came back positive and is currently being treated with Keflex  Patient has no breast concerns  Patient is not due for a pap smear at this visit  Vag Hyst (2015)  6/17/20 Normal Mammo  2018 Colonoscopy

## 2021-10-06 DIAGNOSIS — K51.919 ULCERATIVE COLITIS WITH COMPLICATION, UNSPECIFIED LOCATION (HCC): Primary | ICD-10-CM

## 2021-10-06 RX ORDER — BALSALAZIDE DISODIUM 750 MG/1
2250 CAPSULE ORAL 2 TIMES DAILY
Qty: 180 CAPSULE | Refills: 5 | Status: SHIPPED | OUTPATIENT
Start: 2021-10-06 | End: 2022-03-01 | Stop reason: SDUPTHER

## 2022-02-24 ENCOUNTER — TELEPHONE (OUTPATIENT)
Dept: GASTROENTEROLOGY | Facility: CLINIC | Age: 62
End: 2022-02-24

## 2022-02-24 NOTE — TELEPHONE ENCOUNTER
Recall call went out via [employer] in 4/2021 with no return calls from pt to schedule  Pt is due for an EGD with Dr Mary Vegas for hx of GERD/gastric ulcer  I called and spoke to pt whom informed she did speak to Dr Mary Vegas at the time of her colon and informed him she was not having any problems so did not want the EGD

## 2022-03-01 ENCOUNTER — TELEPHONE (OUTPATIENT)
Dept: GASTROENTEROLOGY | Facility: CLINIC | Age: 62
End: 2022-03-01

## 2022-03-01 DIAGNOSIS — K51.919 ULCERATIVE COLITIS WITH COMPLICATION, UNSPECIFIED LOCATION (HCC): Primary | ICD-10-CM

## 2022-03-01 RX ORDER — BALSALAZIDE DISODIUM 750 MG/1
2250 CAPSULE ORAL 2 TIMES DAILY
Qty: 180 CAPSULE | Refills: 0 | Status: SHIPPED | OUTPATIENT
Start: 2022-03-01 | End: 2022-04-23

## 2022-03-01 NOTE — TELEPHONE ENCOUNTER
Colon done 05-,HX of ulcerative colitis  Pt called requesting a refill of her Basalazide to be sent to [facility] DR EFFIE NASH  [facility] not have any

## 2022-03-01 NOTE — TELEPHONE ENCOUNTER
Pt called to inform that her pharmacy cannot fill her balsalazide due to it not being available  She states she will call other pharmacies to see if they have it and then she will let us know

## 2022-04-23 DIAGNOSIS — K51.919 ULCERATIVE COLITIS WITH COMPLICATION, UNSPECIFIED LOCATION (HCC): ICD-10-CM

## 2022-04-23 RX ORDER — BALSALAZIDE DISODIUM 750 MG/1
CAPSULE ORAL
Qty: 180 CAPSULE | Refills: 0 | Status: SHIPPED | OUTPATIENT
Start: 2022-04-23 | End: 2022-04-25

## 2022-04-25 RX ORDER — BALSALAZIDE DISODIUM 750 MG/1
2250 CAPSULE ORAL 2 TIMES DAILY
Qty: 180 CAPSULE | Refills: 1 | Status: SHIPPED | OUTPATIENT
Start: 2022-04-25 | End: 2022-06-26

## 2022-04-25 NOTE — TELEPHONE ENCOUNTER
Patient needs follow-up appointment please schedule  Patient has not been seen in office for some time and last colonoscopy in 2021    Thank you

## 2022-04-25 NOTE — TELEPHONE ENCOUNTER
Pt is scheduled  She will see Dr Brant Lucas 5/31  She will run out of meds before then  She may need 1 more refill called in

## 2022-05-31 ENCOUNTER — OFFICE VISIT (OUTPATIENT)
Dept: GASTROENTEROLOGY | Facility: CLINIC | Age: 62
End: 2022-05-31
Payer: COMMERCIAL

## 2022-05-31 VITALS
SYSTOLIC BLOOD PRESSURE: 106 MMHG | DIASTOLIC BLOOD PRESSURE: 64 MMHG | WEIGHT: 164 LBS | HEIGHT: 64 IN | BODY MASS INDEX: 28 KG/M2 | HEART RATE: 75 BPM

## 2022-05-31 DIAGNOSIS — K51.90 ULCERATIVE COLITIS WITHOUT COMPLICATIONS, UNSPECIFIED LOCATION (HCC): ICD-10-CM

## 2022-05-31 DIAGNOSIS — R10.13 EPIGASTRIC PAIN: Primary | ICD-10-CM

## 2022-05-31 PROCEDURE — 99214 OFFICE O/P EST MOD 30 MIN: CPT | Performed by: INTERNAL MEDICINE

## 2022-05-31 RX ORDER — PANTOPRAZOLE SODIUM 20 MG/1
20 TABLET, DELAYED RELEASE ORAL DAILY
Qty: 30 TABLET | Refills: 5 | Status: SHIPPED | OUTPATIENT
Start: 2022-05-31

## 2022-05-31 RX ORDER — SPIRONOLACTONE 25 MG/1
TABLET ORAL
COMMUNITY
Start: 2022-05-14

## 2022-05-31 RX ORDER — CEFUROXIME AXETIL 250 MG/1
250 TABLET ORAL 2 TIMES DAILY
COMMUNITY
Start: 2022-05-22

## 2022-05-31 RX ORDER — KETOCONAZOLE 20 MG/G
CREAM TOPICAL
COMMUNITY
Start: 2022-05-16

## 2022-05-31 NOTE — PROGRESS NOTES
[facility] - Outpatient Follow-up Note  Dimitris Simmons 64 y o  female MRN: [id number]  Encounter: [id number]          ASSESSMENT AND PLAN:      1  Epigastric pain  Patient complains of epigastric pain and discomfort  This pain is happening about once in two weeks  Pain is lasting for few hours and then it goes away  There is no nausea or vomiting with it  Patient is not able to point out the precipitating factor  She is not sure if it comes after eating  There is no relationship of the pain with bowel movements  Pain however has been quite bothersome for her  All start her on pantoprazole empirically  Will do ultrasound of her abdomen and blood work  I will see her in 4-6 weeks of further evaluation and recommendations  She may need endoscopy for pain and discomfort persists  - pantoprazole (PROTONIX) 20 mg tablet; Take 1 tablet (20 mg total) by mouth daily  Dispense: 30 tablet; Refill: 5  - US abdomen complete; Future  - Comprehensive metabolic panel; Future  - CBC and differential; Future  - Lipase; Future    2  Ulcerative colitis without complications, unspecified location [facility])  History of ulcerative colitis  Currently stable  There is no diarrhea or rectal bleeding     ______________________________________________________________________    SUBJECTIVE:  Abdominal pain as mentioned above  Unable to relate the pain with food or bowel movements  She will keep an eye on this for next time  There is no weight loss or weight gain  Colitis have been stable and there is no diarrhea  REVIEW OF SYSTEMS IS OTHERWISE NEGATIVE        Historical Information   Past Medical History:   Diagnosis Date    Anxiety     Colitis, chronic, ulcerative, unspecified complication ([facility] [address] )     Colon polyp     GERD (gastroesophageal reflux disease)     Hypertension     Irregular heart beat     a fib  had cardioversion 2008  resolved    Ulcerative colitis [facility])      Past Surgical History:   Procedure Laterality Date    COLONOSCOPY      HYSTERECTOMY      OVARIAN CYST REMOVAL      WISDOM TOOTH EXTRACTION       Social History   Social History     Substance and Sexual Activity   Alcohol Use Yes    Comment: occ     Social History     Substance and Sexual Activity   Drug Use Never     Social History     Tobacco Use   Smoking Status Former Smoker    Packs/day: 1 00    Years: 5 00    Pack years: 5 00    Types: Cigarettes   Smokeless Tobacco Never Used     History reviewed  No pertinent family history  Meds/Allergies       Current Outpatient Medications:     acetaminophen (TYLENOL) 500 mg tablet    ALPRAZolam (XANAX) 0 25 mg tablet    aspirin (ECOTRIN LOW STRENGTH) 81 mg EC tablet    atorvastatin (LIPITOR) 20 mg tablet    balsalazide (COLAZAL) 750 mg capsule    calcium carbonate (OS-JESSIE) 600 MG tablet    calcium carbonate (TUMS) 500 mg chewable tablet    cefuroxime (CEFTIN) 250 mg tablet    cetirizine (ZyrTEC) 10 MG chewable tablet    Cholecalciferol 1000 units capsule    Cranberry 1000 MG CAPS    diltiazem (CARDIZEM CD) 180 mg 24 hr capsule    losartan-hydrochlorothiazide (HYZAAR) 100-12 5 MG per tablet    melatonin 3 mg    metoprolol tartrate (LOPRESSOR) 50 mg tablet    multivitamin (THERAGRAN) TABS    Omega-3 Fatty Acids (OMEGA-3 FISH OIL) 1000 MG CAPS    pantoprazole (PROTONIX) 20 mg tablet    potassium chloride (K-DUR,KLOR-CON) 10 mEq tablet    Probiotic Product (PROBIOTIC-10 PO)    B Complex Vitamins (B COMPLEX 1 PO)    Biotin 1000 MCG tablet    cephalexin (KEFLEX) 500 mg capsule    cyanocobalamin (VITAMIN B-12) 100 mcg tablet    famotidine (PEPCID) 10 mg tablet    Guaifenesin 1200 MG TB12    ketoconazole (NIZORAL) 2 % cream    loratadine (CLARITIN) 10 mg tablet    spironolactone (ALDACTONE) 25 mg tablet    No Known Allergies        Objective     Blood pressure 106/64, pulse 75, height 5' 4" (1 626 m), weight 74 4 kg (164 lb), not currently breastfeeding  Body mass index is 28 15 kg/m²  PHYSICAL EXAM:      General Appearance:   Alert, cooperative, no distress   HEENT:   Normocephalic, atraumatic, anicteric      Neck:  Supple, symmetrical, trachea midline   Lungs:   Clear to auscultation bilaterally; no rales, rhonchi or wheezing; respirations unlabored    Heart[de-identified]   Regular rate and rhythm; no murmur, rub, or gallop  Abdomen:   Soft, non-tender, non-distended; normal bowel sounds; no masses, no organomegaly    Genitalia:   Deferred    Rectal:   Deferred    Extremities:  No cyanosis, clubbing or edema    Pulses:  2+ and symmetric    Skin:  No jaundice, rashes, or lesions    Lymph nodes:  No palpable cervical lymphadenopathy        Lab Results:   No visits with results within 1 Day(s) from this visit     Latest known visit with results is:   Hospital Outpatient Visit on 05/17/2021   Component Date Value    Case Report 05/17/2021                      Value:Surgical Pathology Report                         Case: [id number]                                   Authorizing Provider:  Ludwin Strong MD      Collected:           05/17/2021 1104              Ordering Location:     [address] [place] [address] Received:            05/17/2021 [address] [place] [address]                                                                  Pathologist:           Jairo Noirega MD                                                                Specimens:   A) - Large Intestine, Right/Ascending Colon, PROX ASCENDING-cold snare-polyp x1                     B) - Large Intestine, Right/Ascending Colon, ASCENDING-cold bx-hx of UC                             C) - Large Intestine, Transverse Colon, TRANSVERSE-cold bx-hx of UC                                 D) - Large Intestine, Left/Descending Colon, DESCENDING-cold bx-hx of UC                            E) - Large Intestine, Sigmoid Colon, SIGMOID-cold bx-hx of UC                              Final Diagnosis 05/17/2021                      Value: This result contains rich text formatting which cannot be displayed here   Additional Information 05/17/2021                      Value: This result contains rich text formatting which cannot be displayed here   Synoptic Checklist 05/17/2021                      Value:  (COLON/RECTUM POLYP FORM - GI - A)                                                                                                                 : Adenoma(s)     Bar[place] Colder Description 05/17/2021                      Value: This result contains rich text formatting which cannot be displayed here  Radiology Results:   No results found

## 2022-06-02 ENCOUNTER — NURSE TRIAGE (OUTPATIENT)
Dept: OTHER | Facility: OTHER | Age: 62
End: 2022-06-02

## 2022-06-02 NOTE — TELEPHONE ENCOUNTER
Reason for Disposition   Caller has medicine question only, adult not sick, and triager answers question    Answer Assessment - Initial Assessment Questions  1  NAME of MEDICATION: "What medicine are you calling about?"      Pantoprazole     2  QUESTION: "What is your question?" (e g , medication refill, side effect)      Patient calling in stating that she saw that some of the side effects of the protonix that she is to start could effect her kidney function and she is concerned if she should start taking it  3  PRESCRIBING HCP: "Who prescribed it?" Reason: if prescribed by specialist, call should be referred to that group  Gastroenterologist    Patient calling in concerned of some of the side effects listed for her protonix  Explained to the patient that the risk for developing kidney dysfunction from this medication is very low and typically would only occur if she was taking high doses of it over long periods of time  Informed patient that she is on a very low starting dose  Patient verbalized understanding and has no further questions at this time      Protocols used: MEDICATION QUESTION CALL-ADULT-[place]

## 2022-06-02 NOTE — TELEPHONE ENCOUNTER
Regarding: Medication Question/Side Effects  ----- Message from Karla Thibodeaux sent at 6/2/2022  9:52 AM EDT -----  "I was prescribed pantoprazole (PROTONIX) 20 mg tablet by Dr Wiley Ken and I was reading the side effects and I'm not sure if I should take this   I'd like to speak to someone about this "

## 2022-06-07 ENCOUNTER — TELEPHONE (OUTPATIENT)
Dept: OTHER | Facility: OTHER | Age: 62
End: 2022-06-07

## 2022-06-07 NOTE — TELEPHONE ENCOUNTER
I called patient and went over bw results  Please see bw results and advise if any recommendations  Patient was concerned about lipase being elevated  She states she is getting US done 6/10/22   Thank you

## 2022-06-07 NOTE — TELEPHONE ENCOUNTER
Brayan Toribio (Self) [phone] (H)     Is requesting a call back regarding her results [facility]    EXTERNAL ORDER PANEL (Order #[id number]) on 6/7/22

## 2022-06-09 ENCOUNTER — TELEPHONE (OUTPATIENT)
Dept: OTHER | Facility: OTHER | Age: 62
End: 2022-06-09

## 2022-06-09 NOTE — TELEPHONE ENCOUNTER
Patient had her ultrasound completed at [facility]  The results are back and scanned in under media yesterday @ 11:44pm  Please review and contact patient with results

## 2022-06-10 ENCOUNTER — TELEPHONE (OUTPATIENT)
Dept: OTHER | Facility: OTHER | Age: 62
End: 2022-06-10

## 2022-06-20 ENCOUNTER — TRANSCRIBE ORDERS (OUTPATIENT)
Dept: OBGYN CLINIC | Facility: CLINIC | Age: 62
End: 2022-06-20

## 2022-06-20 DIAGNOSIS — Z12.31 ENCOUNTER FOR SCREENING MAMMOGRAM FOR MALIGNANT NEOPLASM OF BREAST: Primary | ICD-10-CM

## 2022-06-28 DIAGNOSIS — Z12.39 BREAST SCREENING: ICD-10-CM

## 2022-06-30 ENCOUNTER — ANNUAL EXAM (OUTPATIENT)
Dept: OBGYN CLINIC | Facility: CLINIC | Age: 62
End: 2022-06-30
Payer: COMMERCIAL

## 2022-06-30 VITALS
BODY MASS INDEX: 27.14 KG/M2 | WEIGHT: 159 LBS | SYSTOLIC BLOOD PRESSURE: 100 MMHG | HEIGHT: 64 IN | DIASTOLIC BLOOD PRESSURE: 62 MMHG

## 2022-06-30 DIAGNOSIS — Z12.31 ENCOUNTER FOR SCREENING MAMMOGRAM FOR BREAST CANCER: ICD-10-CM

## 2022-06-30 DIAGNOSIS — Z78.0 POSTMENOPAUSAL: ICD-10-CM

## 2022-06-30 DIAGNOSIS — Z12.39 ENCOUNTER FOR SCREENING BREAST EXAMINATION: ICD-10-CM

## 2022-06-30 DIAGNOSIS — Z01.419 ENCOUNTER FOR WELL WOMAN EXAM: Primary | ICD-10-CM

## 2022-06-30 PROCEDURE — 99396 PREV VISIT EST AGE 40-64: CPT | Performed by: PHYSICIAN ASSISTANT

## 2022-06-30 NOTE — PROGRESS NOTES
Assessment/Plan:    No problem-specific Assessment & Plan notes found for this encounter  Diagnoses and all orders for this visit:    Encounter for well woman exam    Encounter for screening breast examination    Encounter for screening mammogram for breast cancer  -     Mammo screening bilateral w 3d & cad; Future    Postmenopausal          Subjective:      Patient ID: Lisha Martinez is a 58 y o  female  Pt presents for her annual exam today--  She has no complaints  She has no bleeding or pelvic pain--hyster  Bowel and bladder are regular  H/o IC and UC  Colonoscopy--2021  No breast concerns today  Last mammo--2022      No pap today  rx mammo  Daily ca, d        The following portions of the patient's history were reviewed and updated as appropriate: allergies, current medications, past family history, past medical history, past social history, past surgical history and problem list     Review of Systems   Constitutional: Negative for chills, fever and unexpected weight change  Gastrointestinal: Negative for abdominal pain, blood in stool, constipation and diarrhea  Genitourinary: Negative  Objective:      /62   Ht 5' 4" (1 626 m)   Wt 72 1 kg (159 lb)   LMP  (LMP Unknown)   BMI 27 29 kg/m²          Physical Exam  Vitals and nursing note reviewed  Constitutional:       Appearance: She is well-developed  HENT:      Head: Normocephalic and atraumatic  Chest:   Breasts:      Right: No inverted nipple, mass, nipple discharge or skin change  Left: No inverted nipple, mass, nipple discharge or skin change  Abdominal:      Palpations: Abdomen is soft  Genitourinary:     Exam position: Supine  Labia:         Right: No rash, tenderness or lesion  Left: No rash, tenderness or lesion  Vagina: Normal       Cervix: No cervical motion tenderness, discharge or friability  Uterus: Absent  Adnexa:         Right: No mass, tenderness or fullness  Left: No mass, tenderness or fullness  Musculoskeletal:      Cervical back: Normal range of motion  Lymphadenopathy:      Lower Body: No right inguinal adenopathy  No left inguinal adenopathy

## 2022-07-19 ENCOUNTER — OFFICE VISIT (OUTPATIENT)
Dept: GASTROENTEROLOGY | Facility: CLINIC | Age: 62
End: 2022-07-19
Payer: COMMERCIAL

## 2022-07-19 VITALS — BODY MASS INDEX: 27.45 KG/M2 | HEIGHT: 64 IN | WEIGHT: 160.8 LBS

## 2022-07-19 DIAGNOSIS — R74.8 ELEVATED LIPASE: Primary | ICD-10-CM

## 2022-07-19 DIAGNOSIS — K51.919 ULCERATIVE COLITIS WITH COMPLICATION, UNSPECIFIED LOCATION (HCC): ICD-10-CM

## 2022-07-19 DIAGNOSIS — K76.89 LIVER CYST: ICD-10-CM

## 2022-07-19 DIAGNOSIS — N28.1 RENAL CYST: ICD-10-CM

## 2022-07-19 PROCEDURE — 99214 OFFICE O/P EST MOD 30 MIN: CPT | Performed by: INTERNAL MEDICINE

## 2022-07-19 RX ORDER — DILTIAZEM HYDROCHLORIDE 240 MG/1
CAPSULE, COATED, EXTENDED RELEASE ORAL
COMMUNITY
Start: 2022-05-21

## 2022-07-19 NOTE — PROGRESS NOTES
[facility] - Outpatient Follow-up Note  Sacha Taveras 58 y o  female MRN: [id number]  Encounter: [id number]          ASSESSMENT AND PLAN:      1  Elevated lipase  Patient has lipase evaluation because of epigastric pain  The pain is completely resolved  The lipomas was very mildly elevated  I have reassured patient that there is not much to worry about from this  This is really within range a variable normal   Will repeat her lipase again in six months  If her pain returns or persists then this will be done sooner of course  I have reviewed the findings of her imaging studies  There were small liver and renal cysts noted  Pancreas appeared completely normal     - Lipase; Future    2  Ulcerative colitis with complication, unspecified location [facility])  She has history of colitis  Currently stable  She has normal bowel movements  There is no abdominal pain or discomfort  Patient is it quite regarding her next surveillance colonoscopy  I have told the patient that should be done next year  She understands  3  Liver cyst  Patient has 8 mm liver cyst in the left lobe  This is of no consequence  I told the patient that there is nothing to worry about this small cyst   We may consider other imaging study after one year  4  Renal cyst  Patient has history of renal cyst   The cyst was visible on scan  A repeat ultrasound has been ordered by primary care  I do not believe that the cyst is of any consequence     ______________________________________________________________________    SUBJECTIVE:  Denies any abdominal pain or discomfort  There is no nausea vomiting  There is no heartburn indigestion  There is no diarrhea or rectal bleeding  There is no bloating or gassiness  REVIEW OF SYSTEMS IS OTHERWISE NEGATIVE        Historical Information   Past Medical History:   Diagnosis Date    Anxiety     Colitis, chronic, ulcerative, unspecified complication ([facility] [address] )     Colon polyp     GERD (gastroesophageal reflux disease)     Hypertension     Irregular heart beat     a fib  had cardioversion 2008  resolved    Ulcerative colitis ([address] )      Past Surgical History:   Procedure Laterality Date    COLONOSCOPY      HYSTERECTOMY      OVARIAN CYST REMOVAL      WISDOM TOOTH EXTRACTION       Social History   Social History     Substance and Sexual Activity   Alcohol Use Not Currently    Alcohol/week: 3 0 standard drinks    Types: 3 Glasses of wine per week     Social History     Substance and Sexual Activity   Drug Use Never     Social History     Tobacco Use   Smoking Status Former Smoker    Packs/day: 1 00    Years: 5 00    Pack years: 5 00    Types: Cigarettes   Smokeless Tobacco Never Used     Family History   Problem Relation Age of Onset    Breast cancer Mother     Breast cancer Maternal Grandmother     Breast cancer Maternal Aunt        Meds/Allergies       Current Outpatient Medications:     acetaminophen (TYLENOL) 500 mg tablet    aspirin (ECOTRIN LOW STRENGTH) 81 mg EC tablet    atorvastatin (LIPITOR) 20 mg tablet    balsalazide (COLAZAL) 750 mg capsule    calcium carbonate (OS-JESSIE) 600 MG tablet    calcium carbonate (TUMS) 500 mg chewable tablet    cetirizine (ZyrTEC) 10 MG chewable tablet    Cholecalciferol 1000 units capsule    Cranberry 1000 MG CAPS    diltiazem (CARDIZEM CD) 240 mg 24 hr capsule    ketoconazole (NIZORAL) 2 % cream    losartan-hydrochlorothiazide (HYZAAR) 100-12 5 MG per tablet    melatonin 3 mg    multivitamin (THERAGRAN) TABS    Omega-3 Fatty Acids (OMEGA-3 FISH OIL) 1000 MG CAPS    potassium chloride (K-DUR,KLOR-CON) 10 mEq tablet    Probiotic Product (PROBIOTIC-10 PO)    spironolactone (ALDACTONE) 25 mg tablet    ALPRAZolam (XANAX) 0 25 mg tablet    B Complex Vitamins (B COMPLEX 1 PO)    Biotin 1000 MCG tablet    cefuroxime (CEFTIN) 250 mg tablet    cephalexin (KEFLEX) 500 mg capsule    cyanocobalamin (VITAMIN B-12) 100 mcg tablet    diltiazem (CARDIZEM CD) 180 mg 24 hr capsule    famotidine (PEPCID) 10 mg tablet    Guaifenesin 1200 MG TB12    loratadine (CLARITIN) 10 mg tablet    metoprolol tartrate (LOPRESSOR) 50 mg tablet    pantoprazole (PROTONIX) 20 mg tablet    No Known Allergies        Objective     Height 5' 4" (1 626 m), weight 72 9 kg (160 lb 12 8 oz), not currently breastfeeding  Body mass index is 27 6 kg/m²  PHYSICAL EXAM:      General Appearance:   Alert, cooperative, no distress   HEENT:   Normocephalic, atraumatic, anicteric      Neck:  Supple, symmetrical, trachea midline   Lungs:   Clear to auscultation bilaterally; no rales, rhonchi or wheezing; respirations unlabored    Heart[de-identified]   Regular rate and rhythm; no murmur, rub, or gallop  Abdomen:   Soft, non-tender, non-distended; normal bowel sounds; no masses, no organomegaly    Genitalia:   Deferred    Rectal:   Deferred    Extremities:  No cyanosis, clubbing or edema    Pulses:  2+ and symmetric    Skin:  No jaundice, rashes, or lesions    Lymph nodes:  No palpable cervical lymphadenopathy        Lab Results:   No visits with results within 1 Day(s) from this visit     Latest known visit with results is:   Hospital Outpatient Visit on 05/17/2021   Component Date Value    Case Report 05/17/2021                      Value:Surgical Pathology Report                         Case: [id number]                                   Authorizing Provider:  Heydi Hernandez MD      Collected:           05/17/2021 1104              Ordering Location:     [address] [place] [address] Received:            05/17/2021 [address] [place] [address]                                                                  Pathologist:           Dayana Villatoro MD                                                                Specimens:   A) - Large Intestine, Right/Ascending Colon, PROX ASCENDING-cold snare-polyp x1                     B) - Large Intestine, Right/Ascending Colon, ASCENDING-cold bx-hx of UC                             C) - Large Intestine, Transverse Colon, TRANSVERSE-cold bx-hx of UC                                 D) - Large Intestine, Left/Descending Colon, DESCENDING-cold bx-hx of UC                            E) - Large Intestine, Sigmoid Colon, SIGMOID-cold bx-hx of UC                              Final Diagnosis 05/17/2021                      Value: This result contains rich text formatting which cannot be displayed here   Additional Information 05/17/2021                      Value: This result contains rich text formatting which cannot be displayed here   Synoptic Checklist 05/17/2021                      Value:  (COLON/RECTUM POLYP FORM - GI - A)                                                                                                                 : Adenoma(s)     Judd Aloe Description 05/17/2021                      Value: This result contains rich text formatting which cannot be displayed here  Radiology Results:   No results found

## 2022-07-31 DIAGNOSIS — K51.919 ULCERATIVE COLITIS WITH COMPLICATION, UNSPECIFIED LOCATION (HCC): ICD-10-CM

## 2022-08-03 DIAGNOSIS — K51.919 ULCERATIVE COLITIS WITH COMPLICATION, UNSPECIFIED LOCATION (HCC): ICD-10-CM

## 2022-08-03 RX ORDER — BALSALAZIDE DISODIUM 750 MG/1
CAPSULE ORAL
Qty: 180 CAPSULE | Refills: 0 | Status: SHIPPED | OUTPATIENT
Start: 2022-08-03 | End: 2022-08-30 | Stop reason: SDUPTHER

## 2022-08-03 RX ORDER — BALSALAZIDE DISODIUM 750 MG/1
CAPSULE ORAL
Qty: 180 CAPSULE | Refills: 0 | OUTPATIENT
Start: 2022-08-03

## 2022-08-03 NOTE — TELEPHONE ENCOUNTER
Patient called in for a refill of her medication balsalazide (COLAZAL) 750 mg, patient stated that she only have her dose for tonight and tomorrow morning.

## 2022-08-29 DIAGNOSIS — K51.919 ULCERATIVE COLITIS WITH COMPLICATION, UNSPECIFIED LOCATION (HCC): ICD-10-CM

## 2022-08-29 NOTE — TELEPHONE ENCOUNTER
Medication Refill Request     Name balsalazide (COLAZAL) 750 mg capsule   Dose/Frequency 3 capsules twice daily  Quantity 180  Verified pharmacy   [x]  Verified ordering Provider   [x]  Does patient have enough for the next 3 days? Yes [x] No []    Patient is requesting 3 refills if possible

## 2022-08-30 DIAGNOSIS — K51.919 ULCERATIVE COLITIS WITH COMPLICATION, UNSPECIFIED LOCATION (HCC): ICD-10-CM

## 2022-08-30 RX ORDER — BALSALAZIDE DISODIUM 750 MG/1
2250 CAPSULE ORAL 2 TIMES DAILY
Qty: 180 CAPSULE | Refills: 3 | Status: SHIPPED | OUTPATIENT
Start: 2022-08-30

## 2022-08-30 RX ORDER — BALSALAZIDE DISODIUM 750 MG/1
2250 CAPSULE ORAL 2 TIMES DAILY
Qty: 180 CAPSULE | Refills: 0 | OUTPATIENT
Start: 2022-08-30

## 2022-12-22 DIAGNOSIS — K51.919 ULCERATIVE COLITIS WITH COMPLICATION, UNSPECIFIED LOCATION (HCC): ICD-10-CM

## 2022-12-22 RX ORDER — BALSALAZIDE DISODIUM 750 MG/1
2250 CAPSULE ORAL 2 TIMES DAILY
Qty: 180 CAPSULE | Refills: 0 | Status: SHIPPED | OUTPATIENT
Start: 2022-12-22

## 2023-01-05 ENCOUNTER — TELEPHONE (OUTPATIENT)
Dept: GASTROENTEROLOGY | Facility: CLINIC | Age: 63
End: 2023-01-05

## 2023-01-16 DIAGNOSIS — K51.919 ULCERATIVE COLITIS WITH COMPLICATION, UNSPECIFIED LOCATION (HCC): ICD-10-CM

## 2023-01-16 RX ORDER — BALSALAZIDE DISODIUM 750 MG/1
2250 CAPSULE ORAL 2 TIMES DAILY
Qty: 180 CAPSULE | Refills: 0 | Status: SHIPPED | OUTPATIENT
Start: 2023-01-16

## 2023-02-20 DIAGNOSIS — K51.919 ULCERATIVE COLITIS WITH COMPLICATION, UNSPECIFIED LOCATION (HCC): ICD-10-CM

## 2023-02-23 RX ORDER — BALSALAZIDE DISODIUM 750 MG/1
2250 CAPSULE ORAL 2 TIMES DAILY
Qty: 180 CAPSULE | Refills: 0 | Status: SHIPPED | OUTPATIENT
Start: 2023-02-23

## 2023-03-28 ENCOUNTER — OFFICE VISIT (OUTPATIENT)
Dept: GASTROENTEROLOGY | Facility: CLINIC | Age: 63
End: 2023-03-28

## 2023-03-28 VITALS
SYSTOLIC BLOOD PRESSURE: 119 MMHG | BODY MASS INDEX: 27.83 KG/M2 | HEIGHT: 64 IN | WEIGHT: 163 LBS | HEART RATE: 71 BPM | DIASTOLIC BLOOD PRESSURE: 65 MMHG

## 2023-03-28 DIAGNOSIS — K21.00 GASTROESOPHAGEAL REFLUX DISEASE WITH ESOPHAGITIS WITHOUT HEMORRHAGE: Primary | ICD-10-CM

## 2023-03-28 DIAGNOSIS — K51.919 ULCERATIVE COLITIS WITH COMPLICATION, UNSPECIFIED LOCATION (HCC): Primary | ICD-10-CM

## 2023-03-28 DIAGNOSIS — R07.89 ATYPICAL CHEST PAIN: ICD-10-CM

## 2023-03-28 DIAGNOSIS — K51.919 ULCERATIVE COLITIS WITH COMPLICATION, UNSPECIFIED LOCATION (HCC): ICD-10-CM

## 2023-03-28 DIAGNOSIS — K21.00 GASTROESOPHAGEAL REFLUX DISEASE WITH ESOPHAGITIS WITHOUT HEMORRHAGE: ICD-10-CM

## 2023-03-28 RX ORDER — BALSALAZIDE DISODIUM 750 MG/1
2250 CAPSULE ORAL 2 TIMES DAILY
Qty: 540 CAPSULE | Refills: 3 | Status: SHIPPED | OUTPATIENT
Start: 2023-03-28 | End: 2023-03-29 | Stop reason: SDUPTHER

## 2023-03-28 NOTE — PROGRESS NOTES
[facility] - Outpatient Follow-up Note  Glendy Pastor 58 y o  female MRN: [id number]  Encounter: [id number]          ASSESSMENT AND PLAN:      1  Gastroesophageal reflux disease with esophagitis without hemorrhage  Patient is having significant reflux symptoms and chest discomfort  She was in the emergency room with atypical chest pain  Patient had cardiovascular work-up and also work-up for pulmonary embolism which were negative  Her symptoms have gotten somewhat better  However she still has epigastric discomfort and heartburn  Upper endoscopy will be needed for evaluation and diagnosis of her symptoms    - EGD; Future    2  Ulcerative colitis with complication, unspecified location [facility])  She has history of ulcerative colitis for more than 20 years  A colonoscopy will be done again for screening purposes for random biopsy for evaluation of any dysplasia that may have taken place     - Colonoscopy; Future  - balsalazide (COLAZAL) 750 mg capsule; Take 3 capsules (2,250 mg total) by mouth 2 (two) times a day  Dispense: 540 capsule; Refill: 3    3  Atypical chest pain  Patient had atypical chest pain  Cardiac causes have been ruled out  Gastroesophageal reflux disease and esophagitis cannot be excluded  I have recommended an endoscopy  - EGD; Future    ______________________________________________________________________    SUBJECTIVE:   Epigastric discomfort and heartburn  Chest discomfort has been present  She was in the emergency room  Cardiac causes have been excluded  She also did not have any pulmonary embolism  Her last colonoscopy was 2 years ago  EGD and colonoscopy has been recommended  REVIEW OF SYSTEMS IS OTHERWISE NEGATIVE        Historical Information   Past Medical History:   Diagnosis Date   • Anxiety    • Colitis, chronic, ulcerative, unspecified complication ([facility] [address] )    • Colon polyp    • GERD (gastroesophageal reflux disease)    • Hypertension    • Irregular heart beat     a fib  had cardioversion 2008  resolved   • Ulcerative colitis ([address] )      Past Surgical History:   Procedure Laterality Date   • COLONOSCOPY     • HYSTERECTOMY     • OVARIAN CYST REMOVAL     • WISDOM TOOTH EXTRACTION       Social History   Social History     Substance and Sexual Activity   Alcohol Use Not Currently   • Alcohol/week: 3 0 standard drinks   • Types: 3 Glasses of wine per week     Social History     Substance and Sexual Activity   Drug Use Never     Social History     Tobacco Use   Smoking Status Former   • Packs/day: 1 00   • Years: 5 00   • Pack years: 5 00   • Types: Cigarettes   Smokeless Tobacco Never     Family History   Problem Relation Age of Onset   • Breast cancer Mother    • Breast cancer Maternal Grandmother    • Breast cancer Maternal Aunt        Meds/Allergies       Current Outpatient Medications:   •  acetaminophen (TYLENOL) 500 mg tablet  •  aspirin (ECOTRIN LOW STRENGTH) 81 mg EC tablet  •  atorvastatin (LIPITOR) 20 mg tablet  •  balsalazide (COLAZAL) 750 mg capsule  •  calcium carbonate (TUMS) 500 mg chewable tablet  •  cetirizine (ZyrTEC) 10 MG chewable tablet  •  Cholecalciferol 1000 units capsule  •  Cranberry 1000 MG CAPS  •  diltiazem (CARDIZEM CD) 240 mg 24 hr capsule  •  losartan-hydrochlorothiazide (HYZAAR) 100-12 5 MG per tablet  •  melatonin 3 mg  •  multivitamin (THERAGRAN) TABS  •  Omega-3 Fatty Acids (OMEGA-3 FISH OIL) 1000 MG CAPS  •  potassium chloride (K-DUR,KLOR-CON) 10 mEq tablet  •  Probiotic Product (PROBIOTIC-10 PO)  •  spironolactone (ALDACTONE) 25 mg tablet  •  ALPRAZolam (XANAX) 0 25 mg tablet  •  B Complex Vitamins (B COMPLEX 1 PO)  •  Biotin 1000 MCG tablet  •  calcium carbonate (OS-JESSIE) 600 MG tablet  •  cefuroxime (CEFTIN) 250 mg tablet  •  cephalexin (KEFLEX) 500 mg capsule  •  cyanocobalamin (VITAMIN B-12) 100 mcg tablet  •  diltiazem (CARDIZEM CD) 180 mg 24 hr capsule  •  famotidine (PEPCID) 10 mg tablet  •  Guaifenesin 1200 MG "TB12  •  ketoconazole (NIZORAL) 2 % cream  •  loratadine (CLARITIN) 10 mg tablet  •  metoprolol tartrate (LOPRESSOR) 50 mg tablet    No Known Allergies        Objective     Blood pressure 119/65, pulse 71, height 5' 4\" (1 626 m), weight 73 9 kg (163 lb), not currently breastfeeding  Body mass index is 27 98 kg/m²  PHYSICAL EXAM:      General Appearance:   Alert, cooperative, no distress   HEENT:   Normocephalic, atraumatic, anicteric      Neck:  Supple, symmetrical, trachea midline   Lungs:   Clear to auscultation bilaterally; no rales, rhonchi or wheezing; respirations unlabored    Heart[de-identified]   Regular rate and rhythm; no murmur, rub, or gallop  Abdomen:   Soft, non-tender, non-distended; normal bowel sounds; no masses, no organomegaly    Genitalia:   Deferred    Rectal:   Deferred    Extremities:  No cyanosis, clubbing or edema    Pulses:  2+ and symmetric    Skin:  No jaundice, rashes, or lesions    Lymph nodes:  No palpable cervical lymphadenopathy        Lab Results:   No visits with results within 1 Day(s) from this visit     Latest known visit with results is:   Hospital Outpatient Visit on 05/17/2021   Component Date Value   • Case Report 05/17/2021                      Value:Surgical Pathology Report                         Case: [id number]                                   Authorizing Provider:  Pippa Fraser MD      Collected:           05/17/2021 1104              Ordering Location:     [address] [place] [address] Received:            05/17/2021 [address] [place] [address]                                                                  Pathologist:           Carlos Cruz MD                                                                Specimens:   A) - Large Intestine, Right/Ascending Colon, PROX ASCENDING-cold snare-polyp x1                     B) - Large Intestine, Right/Ascending Colon, ASCENDING-cold bx-hx of UC                             C) - Large Intestine, " Transverse Colon, TRANSVERSE-cold bx-hx of UC                                 D) - Large Intestine, Left/Descending Colon, DESCENDING-cold bx-hx of UC                            E) - Large Intestine, Sigmoid Colon, SIGMOID-cold bx-hx of UC                             • Final Diagnosis 05/17/2021                      Value: This result contains rich text formatting which cannot be displayed here  • Additional Information 05/17/2021                      Value: This result contains rich text formatting which cannot be displayed here  • Synoptic Checklist 05/17/2021                      Value:  (COLON/RECTUM POLYP FORM - GI - A)                                                                                                                 : Adenoma(s)     • Gross Description 05/17/2021                      Value: This result contains rich text formatting which cannot be displayed here  Radiology Results:   No results found

## 2023-03-29 DIAGNOSIS — K51.919 ULCERATIVE COLITIS WITH COMPLICATION, UNSPECIFIED LOCATION (HCC): Primary | ICD-10-CM

## 2023-03-29 DIAGNOSIS — K51.919 ULCERATIVE COLITIS WITH COMPLICATION, UNSPECIFIED LOCATION (HCC): ICD-10-CM

## 2023-03-29 RX ORDER — BALSALAZIDE DISODIUM 750 MG/1
2250 CAPSULE ORAL 2 TIMES DAILY
Qty: 180 CAPSULE | Refills: 9 | Status: SHIPPED | OUTPATIENT
Start: 2023-03-29

## 2023-03-29 RX ORDER — BALSALAZIDE DISODIUM 750 MG/1
2250 CAPSULE ORAL 2 TIMES DAILY
Qty: 180 CAPSULE | Refills: 9 | Status: SHIPPED | OUTPATIENT
Start: 2023-03-29 | End: 2023-03-29 | Stop reason: SDUPTHER

## 2023-03-29 NOTE — TELEPHONE ENCOUNTER
Patient is requesting a 30 day supply be sent to her pharmacy instead of a 90 day due to it being cheaper for her monthly through [employer], please send new script to pharmacy, thank you!

## 2023-04-07 ENCOUNTER — PREP FOR PROCEDURE (OUTPATIENT)
Dept: GASTROENTEROLOGY | Facility: CLINIC | Age: 63
End: 2023-04-07

## 2023-04-07 DIAGNOSIS — Z12.11 SCREENING FOR COLON CANCER: Primary | ICD-10-CM

## 2023-05-22 ENCOUNTER — TELEPHONE (OUTPATIENT)
Dept: GASTROENTEROLOGY | Facility: CLINIC | Age: 63
End: 2023-05-22

## 2023-05-22 ENCOUNTER — TELEPHONE (OUTPATIENT)
Dept: OTHER | Facility: HOSPITAL | Age: 63
End: 2023-05-22

## 2023-05-22 ENCOUNTER — ANESTHESIA EVENT (OUTPATIENT)
Dept: ANESTHESIOLOGY | Facility: AMBULATORY SURGERY CENTER | Age: 63
End: 2023-05-22

## 2023-05-22 ENCOUNTER — ANESTHESIA (OUTPATIENT)
Dept: ANESTHESIOLOGY | Facility: AMBULATORY SURGERY CENTER | Age: 63
End: 2023-05-22

## 2023-05-22 NOTE — TELEPHONE ENCOUNTER
Left message reminding pt of her EGD/Colon 6/5 with Dr Panchito Gomez  She will need a , will be called Friday prior with arrival time and he instructions were sent prior  I asked her to call and do the Rico Corrales for prior to 6/5 procedure  Will call again next week if she doesn't respone

## 2023-05-22 NOTE — TELEPHONE ENCOUNTER
Pt stopped by and did ASC form  Sending clearance to Dr Darshan Burkett due to pt having afib in 2008 and sees once a year for a check up  Will call in a week if not returned

## 2023-05-22 NOTE — TELEPHONE ENCOUNTER
"[facility] Assessment    Name: Dell Last  YOB: 1960  Last Height: 5' 4\" (1 626 m)  Last weight: 73 9 kg (163 lb)  BMI: 27 98 kg/m²  Procedure: Colon  Diagnosis:   Date of procedure: 06/05/23  Prep: Golytely/dul  Responsible : yes  Phone#:   Name completing form: Westley Davila  Date form completed: 05/22/23      If the patient answers yes to any of these questions, schedule in a hospital  Are you pregnant: No  Do you rely on a wheelchair for mobility: No  Have you been diagnosed with End Stage Renal Disease (ESRD): No  Do you need oxygen during the day: No  Have you had a heart attack or stroke within the past three months: No  Have you had a seizure within the past three months: No  Have you ever been informed by anesthesia that you have a difficult airway: No  Additional Questions  Have you had any cardiac testing or are under the care of a Cardiologist (see cardiac list): Yes (Comment: Obtain Cardiac Clearance)  Cardiac list:   Do you have an implanted cardiac defibrillator: No (Comment:  This patient should be scheduled in the hospital)    Have any bleeding problems, such as anemia or hemophilia (If patient has H&H result below 8, schedule in hospital   H&H must be within 30 days of procedure): No    Had an organ transplant within the past 3 months: No    Do you have any present infections: No  Do you get short of breath when walking a few blocks: No  Have you been diagnosed with diabetes: No  Comments (provide cardiac provider information if applicable): Dr Cindia Litten Afib 2008- goes once a year for check up       "

## 2023-05-22 NOTE — TELEPHONE ENCOUNTER
Spoke to patient on phone and informed her that her EGD was denied by the insurance  I informed patient that her insurance company requires 8 weeks PPI shows no improvement in symptoms prior to them approving procedure  Patient reports she only has symptoms occasionally and does not want to take a PPI at current time  Patient is okay with holding off on EGD currently and just proceeding with her colonoscopy  I will send a message to Dr Gayatri Pastrana to inform him

## 2023-05-22 NOTE — TELEPHONE ENCOUNTER
----- Message from Bradley Kirkpatrick sent at 5/22/2023  1:44 PM EDT -----  Regarding: RE: EGD Needs Peer to Peer  Insurance denies EGD  I spoke to patient on phone she does not want to do PPI for 8 weeks she said her symptoms are mild and intermittent and does not feel is necessary to take medication at this time  She is okay with holding off on EGD and just proceeding with colonoscopy  I am sending this also to Dr Devang Heredia for he is aware  Thank you  ----- Message -----  From: Faith Lerner  Sent: 5/22/2023   1:27 PM EDT  To: Camille [address] Dr Provider  Subject: EGD Needs Peer to Peer                           Thank you!  ----- Message -----  From: Cortney Steven  Sent: 5/22/2023   1:21 PM EDT  To: Camille Calix has denied, the egd scheduled for 6 5 23 at [facility], ref [id number], peer to peer phone number [phone]  Denial letter has been scanned in the chart  I have not contacted the patient  Send for peer to peer review?

## 2023-05-24 NOTE — TELEPHONE ENCOUNTER
Received clearance from Dr Justina Costello  Pt is a low risk for procedure from a cardiac standpoint  Faxed to [facility]

## 2023-06-05 ENCOUNTER — ANESTHESIA (OUTPATIENT)
Dept: GASTROENTEROLOGY | Facility: AMBULATORY SURGERY CENTER | Age: 63
End: 2023-06-05

## 2023-06-05 ENCOUNTER — ANESTHESIA EVENT (OUTPATIENT)
Dept: GASTROENTEROLOGY | Facility: AMBULATORY SURGERY CENTER | Age: 63
End: 2023-06-05

## 2023-06-05 ENCOUNTER — HOSPITAL ENCOUNTER (OUTPATIENT)
Dept: GASTROENTEROLOGY | Facility: AMBULATORY SURGERY CENTER | Age: 63
Discharge: HOME | End: 2023-06-05
Payer: COMMERCIAL

## 2023-06-05 VITALS
DIASTOLIC BLOOD PRESSURE: 66 MMHG | TEMPERATURE: 97.1 F | HEIGHT: 64 IN | HEART RATE: 68 BPM | BODY MASS INDEX: 26.63 KG/M2 | OXYGEN SATURATION: 97 % | RESPIRATION RATE: 18 BRPM | WEIGHT: 156 LBS | SYSTOLIC BLOOD PRESSURE: 116 MMHG

## 2023-06-05 DIAGNOSIS — K51.919 ULCERATIVE COLITIS WITH COMPLICATION, UNSPECIFIED LOCATION (HCC): ICD-10-CM

## 2023-06-05 PROBLEM — K21.9 GASTROESOPHAGEAL REFLUX DISEASE: Status: ACTIVE | Noted: 2023-06-05

## 2023-06-05 PROBLEM — I10 HTN (HYPERTENSION): Status: ACTIVE | Noted: 2023-06-05

## 2023-06-05 PROCEDURE — 45385 COLONOSCOPY W/LESION REMOVAL: CPT | Performed by: INTERNAL MEDICINE

## 2023-06-05 PROCEDURE — 88305 TISSUE EXAM BY PATHOLOGIST: CPT | Performed by: PATHOLOGY

## 2023-06-05 PROCEDURE — 45380 COLONOSCOPY AND BIOPSY: CPT | Performed by: INTERNAL MEDICINE

## 2023-06-05 RX ORDER — LIDOCAINE HYDROCHLORIDE 10 MG/ML
INJECTION, SOLUTION EPIDURAL; INFILTRATION; INTRACAUDAL; PERINEURAL AS NEEDED
Status: DISCONTINUED | OUTPATIENT
Start: 2023-06-05 | End: 2023-06-05

## 2023-06-05 RX ORDER — SODIUM CHLORIDE, SODIUM LACTATE, POTASSIUM CHLORIDE, CALCIUM CHLORIDE 600; 310; 30; 20 MG/100ML; MG/100ML; MG/100ML; MG/100ML
20 INJECTION, SOLUTION INTRAVENOUS CONTINUOUS
Status: DISCONTINUED | OUTPATIENT
Start: 2023-06-05 | End: 2023-06-09 | Stop reason: HOSPADM

## 2023-06-05 RX ORDER — SODIUM CHLORIDE, SODIUM LACTATE, POTASSIUM CHLORIDE, CALCIUM CHLORIDE 600; 310; 30; 20 MG/100ML; MG/100ML; MG/100ML; MG/100ML
125 INJECTION, SOLUTION INTRAVENOUS CONTINUOUS
Status: DISCONTINUED | OUTPATIENT
Start: 2023-06-05 | End: 2023-06-09 | Stop reason: HOSPADM

## 2023-06-05 RX ORDER — SODIUM CHLORIDE, SODIUM LACTATE, POTASSIUM CHLORIDE, CALCIUM CHLORIDE 600; 310; 30; 20 MG/100ML; MG/100ML; MG/100ML; MG/100ML
INJECTION, SOLUTION INTRAVENOUS CONTINUOUS PRN
Status: DISCONTINUED | OUTPATIENT
Start: 2023-06-05 | End: 2023-06-05

## 2023-06-05 RX ORDER — PROPOFOL 10 MG/ML
INJECTION, EMULSION INTRAVENOUS AS NEEDED
Status: DISCONTINUED | OUTPATIENT
Start: 2023-06-05 | End: 2023-06-05

## 2023-06-05 RX ADMIN — PROPOFOL 20 MG: 10 INJECTION, EMULSION INTRAVENOUS at 09:20

## 2023-06-05 RX ADMIN — PROPOFOL 10 MG: 10 INJECTION, EMULSION INTRAVENOUS at 09:34

## 2023-06-05 RX ADMIN — PROPOFOL 20 MG: 10 INJECTION, EMULSION INTRAVENOUS at 09:17

## 2023-06-05 RX ADMIN — SODIUM CHLORIDE, SODIUM LACTATE, POTASSIUM CHLORIDE, CALCIUM CHLORIDE: 600; 310; 30; 20 INJECTION, SOLUTION INTRAVENOUS at 08:58

## 2023-06-05 RX ADMIN — PROPOFOL 20 MG: 10 INJECTION, EMULSION INTRAVENOUS at 09:32

## 2023-06-05 RX ADMIN — LIDOCAINE HYDROCHLORIDE 50 MG: 10 INJECTION, SOLUTION EPIDURAL; INFILTRATION; INTRACAUDAL; PERINEURAL at 09:13

## 2023-06-05 RX ADMIN — PROPOFOL 20 MG: 10 INJECTION, EMULSION INTRAVENOUS at 09:25

## 2023-06-05 RX ADMIN — PROPOFOL 40 MG: 10 INJECTION, EMULSION INTRAVENOUS at 09:14

## 2023-06-05 RX ADMIN — PROPOFOL 100 MG: 10 INJECTION, EMULSION INTRAVENOUS at 09:13

## 2023-06-05 RX ADMIN — PROPOFOL 20 MG: 10 INJECTION, EMULSION INTRAVENOUS at 09:22

## 2023-06-05 RX ADMIN — PROPOFOL 20 MG: 10 INJECTION, EMULSION INTRAVENOUS at 09:29

## 2023-06-05 NOTE — ANESTHESIA POSTPROCEDURE EVALUATION
Post-Op Assessment Note    CV Status:  Stable  Pain Score: 0    Pain management: adequate     Mental Status:  Alert and awake   Hydration Status:  Euvolemic   PONV Controlled:  Controlled   Airway Patency:  Patent      Post Op Vitals Reviewed: Yes      Staff: Anesthesiologist, CRNA         No notable events documented      /63 (06/05/23 0941)    Temp     Pulse 68 (06/05/23 0941)   Resp 16 (06/05/23 0941)    SpO2 100 % (06/05/23 0941)

## 2023-06-05 NOTE — DISCHARGE SUMMARY
Discharge Summary - Mynor Lewis 58 y o  female MRN: [id number]    Unit/Bed#:  Encounter: [id number]    Admission Date: 6/5/2023    Admitting Diagnosis: Ulcerative colitis with complication, unspecified location [facility]) [[address] [id number]]    HPI: History of longstanding ulcerative colitis currently in remission  History of colon polyp  Procedures Performed: No orders of the defined types were placed in this encounter  Summary of Hospital Course: Tolerated procedure    Significant Findings, Care, Treatment and Services Provided: Single polyp noted in the rectosigmoid junction and removed using a snare  Multiple biopsies of the colon were taken on a random basis from different parts  Complications: None    Discharge Diagnosis: See above    Medical Problems     Resolved Problems  Date Reviewed: 6/30/2022   None         Condition at Discharge: good         Discharge instructions/Information to patient and family:   See after visit summary for information provided to patient and family  Provisions for Follow-Up Care:  See after visit summary for information related to follow-up care and any pertinent home health orders        PCP: Katerin Duron MD    Disposition: Home

## 2023-06-05 NOTE — ANESTHESIA PREPROCEDURE EVALUATION
Procedure:  COLONOSCOPY    Relevant Problems   CARDIO   (+) HTN (hypertension)      GI/HEPATIC   (+) Gastroesophageal reflux disease      Anxiety  Ulcerative colitis  Physical Exam    Airway    Mallampati score: II  TM Distance: >3 FB  Neck ROM: full     Dental   Comment: None loose, No notable dental hx     Cardiovascular      Pulmonary      Other Findings        Anesthesia Plan  ASA Score- 2     Anesthesia Type- IV sedation with anesthesia with ASA Monitors  Additional Monitors:   Airway Plan:     Comment: Last of PO bowel prep: before 05:00 when she had a sip of water with her meds    Patient educated on the possibility for awareness under sedation and of the possibility of airway intervention in the event of an airway or procedural emergency    Plan Factors-Exercise tolerance (METS): >4 METS  Chart reviewed  Patient summary reviewed  Patient is not a current smoker  Induction- intravenous  Postoperative Plan-     Informed Consent- Anesthetic plan and risks discussed with patient  I personally reviewed this patient with the CRNA  Discussed and agreed on the Anesthesia Plan with the CRNA  Paticia Hammans

## 2023-06-09 PROCEDURE — 88305 TISSUE EXAM BY PATHOLOGIST: CPT | Performed by: PATHOLOGY

## 2023-06-14 ENCOUNTER — TELEPHONE (OUTPATIENT)
Dept: GASTROENTEROLOGY | Facility: CLINIC | Age: 63
End: 2023-06-14

## 2023-06-14 NOTE — TELEPHONE ENCOUNTER
Patients GI provider:  Dr Monica Gonzalez    Number to return call: [phone]    Reason for call: Pt calling stating when she scheduled her colonoscopy she spoke sara Penaloza who stated she was fully covered and did not have to pocket out any money  Pt also stated she went onto her EOB (estimated billing) online and saw that she will be getting some bills  She spoke w/ [facility] who stated that the reason why she will be billed is because she is outpatient  Pt wants to speak sara/ Moe Penaloza re: these bills she will be receiving       Scheduled procedure/appointment date if applicable: N/A

## 2023-06-14 NOTE — TELEPHONE ENCOUNTER
I called and spoke with patient, patient stated the insurance is billing her because of where the polyp was sent out to be biopsied, I told the patient that our [facility] lab is the same lab we used for her previous biopsies from her Colonoscopy in 2021, patient stated she will call back her [facility] and give them that information  I also relayed to patient that I do not deal with the billing end of the procedures or biopsies and it would need to be handled through her insurance or our billing department

## 2023-06-27 ENCOUNTER — ANNUAL EXAM (OUTPATIENT)
Dept: GYNECOLOGY | Facility: CLINIC | Age: 63
End: 2023-06-27
Payer: COMMERCIAL

## 2023-06-27 VITALS
WEIGHT: 162 LBS | SYSTOLIC BLOOD PRESSURE: 120 MMHG | BODY MASS INDEX: 27.66 KG/M2 | DIASTOLIC BLOOD PRESSURE: 60 MMHG | HEIGHT: 64 IN

## 2023-06-27 DIAGNOSIS — Z12.39 ENCOUNTER FOR SCREENING BREAST EXAMINATION: ICD-10-CM

## 2023-06-27 DIAGNOSIS — Z01.419 ENCOUNTER FOR WELL WOMAN EXAM: Primary | ICD-10-CM

## 2023-06-27 DIAGNOSIS — Z13.820 SCREENING FOR OSTEOPOROSIS: ICD-10-CM

## 2023-06-27 DIAGNOSIS — Z90.710 HISTORY OF HYSTERECTOMY: ICD-10-CM

## 2023-06-27 DIAGNOSIS — Z12.31 SCREENING MAMMOGRAM FOR BREAST CANCER: ICD-10-CM

## 2023-06-27 PROCEDURE — S0612 ANNUAL GYNECOLOGICAL EXAMINA: HCPCS | Performed by: PHYSICIAN ASSISTANT

## 2023-06-27 NOTE — PROGRESS NOTES
"Assessment/Plan:    No problem-specific Assessment & Plan notes found for this encounter  Diagnoses and all orders for this visit:    Encounter for well woman exam    Encounter for screening breast examination    Screening mammogram for breast cancer  -     Mammo screening bilateral w 3d & cad; Future    History of hysterectomy    Screening for osteoporosis  -     DXA bone density spine hip and pelvis; Future          Subjective:      Patient ID: Nicole Galvez is a 61 y o  female  Pt presents for her annual exam today--  She has no major gyn complaints today  She has no bleeding or pelvic pain--hyster  Bowel and bladder are regular  Colonoscopy--2023  No breast concerns today  Last mammo--2022  dexa--few years--would like to do one this year      No pap today  rx mammo  rx dexa  Daily ca, d      The following portions of the patient's history were reviewed and updated as appropriate: allergies, current medications, past family history, past medical history, past social history, past surgical history and problem list     Review of Systems   Constitutional: Negative for chills, fever and unexpected weight change  Gastrointestinal: Negative for abdominal pain, blood in stool, constipation and diarrhea  Genitourinary: Negative  Objective:      /60   Ht 5' 4\" (1 626 m)   Wt 73 5 kg (162 lb)   LMP  (LMP Unknown)   BMI 27 81 kg/m²          Physical Exam  Vitals and nursing note reviewed  Constitutional:       Appearance: She is well-developed  HENT:      Head: Normocephalic and atraumatic  Chest:   Breasts:     Right: No inverted nipple, mass, nipple discharge or skin change  Left: No inverted nipple, mass, nipple discharge or skin change  Abdominal:      Palpations: Abdomen is soft  Genitourinary:     Exam position: Supine  Labia:         Right: No rash, tenderness or lesion  Left: No rash, tenderness or lesion         Vagina: Normal       Cervix: No cervical " motion tenderness, discharge or friability  Uterus: Absent  Adnexa:         Right: No mass, tenderness or fullness  Left: No mass, tenderness or fullness  Musculoskeletal:      Cervical back: Normal range of motion  Lymphadenopathy:      Lower Body: No right inguinal adenopathy  No left inguinal adenopathy

## 2023-06-28 DIAGNOSIS — Z12.31 ENCOUNTER FOR SCREENING MAMMOGRAM FOR BREAST CANCER: ICD-10-CM

## 2023-06-29 NOTE — TELEPHONE ENCOUNTER
Can Dr Selina Ulloa please call patient back with the results of her recent abdominal ultrasound  Scheduled appt

## 2023-08-07 ENCOUNTER — TELEPHONE (OUTPATIENT)
Age: 63
End: 2023-08-07

## 2023-08-07 NOTE — TELEPHONE ENCOUNTER
Patients GI provider:  Dr. Cristobal Tate    Number to return call: (  [phone]    Reason for call: Silvia from [facility] called on the pt's behalf  to ask if Dr Cristobal Tate can send a new script for her colon, egd and labs that were done in June. It should be coded as routine instead of diagnostic and resubmitted. Pt would like a call back with an update.  Fax is [phone]    Scheduled procedure/appointment date if applicable: N/A

## 2023-08-14 NOTE — TELEPHONE ENCOUNTER
I reached out to [employer] today (REF# [id number]). I was advised the balance due was a coinsurance. I relied the message to   Informed she was told be the insurance co her procedure would be covered 100%. Mrs. Jj Christianson thanked me for getting back to me and stated she would be reaching to the insurance company. I asked her to call me back if I could be of further assitance.

## 2023-08-24 DIAGNOSIS — Z13.820 SCREENING FOR OSTEOPOROSIS: ICD-10-CM

## 2023-11-07 ENCOUNTER — OFFICE VISIT (OUTPATIENT)
Dept: OBGYN CLINIC | Facility: CLINIC | Age: 63
End: 2023-11-07
Payer: COMMERCIAL

## 2023-11-07 ENCOUNTER — HOSPITAL ENCOUNTER (OUTPATIENT)
Dept: RADIOLOGY | Facility: HOSPITAL | Age: 63
Discharge: HOME | End: 2023-11-07
Attending: ORTHOPAEDIC SURGERY
Payer: COMMERCIAL

## 2023-11-07 VITALS
SYSTOLIC BLOOD PRESSURE: 122 MMHG | WEIGHT: 164 LBS | BODY MASS INDEX: 28 KG/M2 | DIASTOLIC BLOOD PRESSURE: 74 MMHG | HEIGHT: 64 IN | HEART RATE: 105 BPM

## 2023-11-07 DIAGNOSIS — M46.1 SACROILIITIS (HCC): Primary | ICD-10-CM

## 2023-11-07 DIAGNOSIS — M76.31 ILIOTIBIAL BAND SYNDROME OF RIGHT SIDE: ICD-10-CM

## 2023-11-07 DIAGNOSIS — M25.551 RIGHT HIP PAIN: ICD-10-CM

## 2023-11-07 PROCEDURE — 73502 X-RAY EXAM HIP UNI 2-3 VIEWS: CPT

## 2023-11-07 PROCEDURE — 99204 OFFICE O/P NEW MOD 45 MIN: CPT | Performed by: ORTHOPAEDIC SURGERY

## 2023-11-07 NOTE — PATIENT INSTRUCTIONS
Voltaren gel 1% also known as diclofenac sodium 1% can be found over-the-counter at your local pharmacy        Stretching:  IT band- Safe for A/P:        Perform effective stretching exercises:   3 sets of 10 repetitions (rep)  Hold each rep for 20-30 seconds        Hip/Glute Strengthening:   *Maintaining a Neutral pelvis while performing hip/glute strengthening is Important to function & ability to perform the exercises effectively *                    Key: Slow & Intentional  Two ways to perform:  Start with 10 reps on each side maintaining neutral pelvis. *   Hold position for a 3-5 count  When form and exercise because less challenging; increase the REPITITIONS or DURATION your holding. Progression to Strengthening Hip & Glute muscles:  Standing position requires your body to use more stabilizing muscles to maintain good form while performing the exercises below:               Progression:   Add resistance! Adding even the lightest theraband can make a simple exercise more challenging. Key: Slow & Intentional  Two ways to perform:  Start with 10 reps on each side maintaining neutral pelvis. *   Hold position for a 3-5 count  When form and exercise because less challenging; increase the REPITITIONS or DURATION your holding.     [place] the exercises intended for patient:

## 2023-11-07 NOTE — PROGRESS NOTES
Assessment:   Diagnosis ICD-10-CM Associated Orders   1. Right hip pain  M25.551 XR hip/pelv 2-3 vws right if performed      2. Iliotibial band syndrome of right side  M76.31 Ambulatory Referral to Physical Therapy      3. Sacroiliitis ([address])  M46.1 Ambulatory Referral to Physical Therapy          Plan:  New x-rays of the right hip/pelvis were obtained today and reviewed with the patient noting that the right hip joint has well-maintained joint space. On exam most the patient's pain today is located along the iliac crest and the IT band. Previous pain was located in the right SI joint. Due to patient progressing in the right direction with her pain, it is recommended to start a course of physical therapy to work on strengthening and stretching to incorporate in her everyday life. Suggested to use topical Voltaren gel 1% can be rubbed in the IT band and SI joint area due to patient not able to take anti-inflammatories per her cardiologist.  IT band stretches and hip strengthening exercises were provided for the patient and placed in her AVS      To do next visit:  Return in about 3 months (around 2/7/2024) for right hip. The above stated was discussed in layman's terms and the patient expressed understanding. All questions were answered to the patient's satisfaction. Scribe Attestation      I,:  Getachew Chavez am acting as a scribe while in the presence of the attending physician.:       I,:  Corona Cantu MD personally performed the services described in this documentation    as scribed in my presence.:               Subjective: Dolores Lisa is a 61 y.o. female who presents today for her right hip pain. She reports that in late June she started noticing pain with walking. She had pain going up inclines. She started going to a chiropractor who uses a trigger gun technique rather than twisting that has helped. She has tried ice, tylenol not NSAIDs due to cardiologist recommendation.  The chiropractor showed her stretches that has helped. Review of systems negative unless otherwise specified in HPI  Review of Systems   Constitutional:  Negative for chills, fever and unexpected weight change. HENT:  Negative for hearing loss, nosebleeds and sore throat. Eyes:  Negative for pain, redness and visual disturbance. Respiratory:  Negative for cough, shortness of breath and wheezing. Cardiovascular:  Negative for chest pain, palpitations and leg swelling. Gastrointestinal:  Negative for abdominal pain and nausea. Genitourinary:  Negative for dyspareunia, dysuria and frequency. Skin:  Negative for rash and wound. Neurological:  Negative for dizziness, numbness and headaches. Psychiatric/Behavioral:  Negative for decreased concentration and suicidal ideas. The patient is not nervous/anxious. Past Medical History:   Diagnosis Date    Anxiety     Colitis, chronic, ulcerative, unspecified complication (HCC)     Colon polyp     GERD (gastroesophageal reflux disease)     Hypertension     Irregular heart beat     a fib  had cardioversion   resolved    Ulcerative colitis ([address])        Past Surgical History:   Procedure Laterality Date    COLONOSCOPY      HYSTERECTOMY      OVARIAN CYST REMOVAL      WISDOM TOOTH EXTRACTION         Family History   Problem Relation Age of Onset    Breast cancer Mother     Breast cancer Maternal Grandmother     Breast cancer Maternal Aunt        Social History     Occupational History    Not on file   Tobacco Use    Smoking status: Former     Packs/day: 1.00     Years: 5.00     Total pack years: 5.00     Types: Cigarettes     Quit date: 46     Years since quittin.8    Smokeless tobacco: Never   Vaping Use    Vaping Use: Never used   Substance and Sexual Activity    Alcohol use:  Yes     Alcohol/week: 3.0 standard drinks of alcohol     Types: 3 Glasses of wine per week     Comment: social    Drug use: Never    Sexual activity: Yes     Partners: Male     Birth control/protection: Female Sterilization         Current Outpatient Medications:     acetaminophen (TYLENOL) 500 mg tablet, Take 500 mg by mouth every 6 (six) hours, Disp: , Rfl:     ALPRAZolam (XANAX) 0.25 mg tablet, Take 0.25 mg by mouth, Disp: , Rfl:     aspirin (ECOTRIN LOW STRENGTH) 81 mg EC tablet, Take 81 mg by mouth daily, Disp: , Rfl:     atorvastatin (LIPITOR) 20 mg tablet, Take 20 mg by mouth, Disp: , Rfl:     B Complex Vitamins (B COMPLEX 1 PO), Take 1 tablet by mouth (Patient not taking: Reported on 6/22/2021), Disp: , Rfl:     balsalazide (COLAZAL) 750 mg capsule, Take 3 capsules (2,250 mg total) by mouth 2 (two) times a day, Disp: 180 capsule, Rfl: 9    Biotin 1000 MCG tablet, Take 1,000 mcg by mouth (Patient not taking: Reported on 6/22/2021), Disp: , Rfl:     calcium carbonate (OS-JESSIE) 600 MG tablet, Take 600 mg by mouth 2 (two) times a day with meals, Disp: , Rfl:     calcium carbonate (TUMS) 500 mg chewable tablet, Chew 1 tablet daily, Disp: , Rfl:     cefuroxime (CEFTIN) 250 mg tablet, Take 250 mg by mouth 2 (two) times a day (Patient not taking: Reported on 7/19/2022), Disp: , Rfl:     cephalexin (KEFLEX) 500 mg capsule, Take 500 mg by mouth every 6 (six) hours (Patient not taking: Reported on 5/31/2022), Disp: , Rfl:     cetirizine (ZyrTEC) 10 MG chewable tablet, Chew 10 mg daily, Disp: , Rfl:     Cholecalciferol 1000 units capsule, Take 1,000 Units by mouth, Disp: , Rfl:     Cranberry 1000 MG CAPS, Take by mouth, Disp: , Rfl:     cyanocobalamin (VITAMIN B-12) 100 mcg tablet, Take by mouth daily (Patient not taking: Reported on 5/31/2022), Disp: , Rfl:     diltiazem (CARDIZEM CD) 240 mg 24 hr capsule, , Disp: , Rfl:     famotidine (PEPCID) 10 mg tablet, Take 10 mg by mouth 2 (two) times a day, Disp: , Rfl:     Guaifenesin 1200 MG TB12, Take as needed , Disp: , Rfl:     ketoconazole (NIZORAL) 2 % cream, APPLY TOPICALLY TO THE AFFECTED AREA TWO TIMES A DAY (Patient not taking: Reported on 3/28/2023), Disp: , Rfl:     loratadine (CLARITIN) 10 mg tablet, Take 10 mg by mouth daily (Patient not taking: Reported on 6/22/2021), Disp: , Rfl:     losartan-hydrochlorothiazide (HYZAAR) 100-12.5 MG per tablet, TAKE 1 TABLET DAILY, Disp: , Rfl:     melatonin 3 mg, Take by mouth, Disp: , Rfl:     metoprolol tartrate (LOPRESSOR) 50 mg tablet, Take 50 mg by mouth, Disp: , Rfl:     multivitamin (THERAGRAN) TABS, Take 1 tablet by mouth daily, Disp: , Rfl:     Omega-3 Fatty Acids (OMEGA-3 FISH OIL) 1000 MG CAPS, Take by mouth, Disp: , Rfl:     potassium chloride (K-DUR,KLOR-CON) 10 mEq tablet, Take 30 mEq by mouth 2 (two) times a day, Disp: , Rfl:     Probiotic Product (PROBIOTIC-10 PO), Take by mouth, Disp: , Rfl:     spironolactone (ALDACTONE) 25 mg tablet, , Disp: , Rfl:     No Known Allergies         Vitals:    11/07/23 0744   BP: 122/74   Pulse: 105       Body mass index is 28.15 kg/m². Wt Readings from Last 3 Encounters:   11/07/23 74.4 kg (164 lb)   06/27/23 73.5 kg (162 lb)   06/05/23 70.8 kg (156 lb)       Objective:                    Right Hip Exam     Tenderness   The patient is experiencing tenderness in the lateral and posterior (SI joint).     Range of Motion   Flexion:  100   External rotation:  40   Internal rotation:  30     Muscle Strength   Abduction: 5/5   Adduction: 5/5   Flexion: 5/5     Tests   TATIANA: negative    Other   Erythema: absent  Sensation: normal  Pulse: present    Comments:  Calf is soft and non tender        Negative Stinchfield test    Diagnostics, reviewed and taken today if performed as documented     The attending physician has personally reviewed the pertinent films in PACS and interpretation is as follows:  Xrays of the right hip/pelvis: Well maintained joint space of bilateral hips with limited view of the lumbar spine with arthritic change in the L4/L5, L5/S1  No osteoarthritic changes noted over the hip    Procedures, if performed today:    Procedures    None performed      Portions of the record may have been created with voice recognition software. Occasional wrong word or "sound a like" substitutions may have occurred due to the inherent limitations of voice recognition software. Read the chart carefully and recognize, using context, where substitutions have occurred.

## 2024-01-15 ENCOUNTER — TELEPHONE (OUTPATIENT)
Dept: GASTROENTEROLOGY | Facility: CLINIC | Age: 64
End: 2024-01-15

## 2024-01-15 DIAGNOSIS — K51.90 ULCERATIVE COLITIS WITHOUT COMPLICATIONS, UNSPECIFIED LOCATION (HCC): Primary | ICD-10-CM

## 2024-01-15 RX ORDER — BALSALAZIDE DISODIUM 750 MG/1
2250 CAPSULE ORAL 2 TIMES DAILY
Qty: 180 CAPSULE | Refills: 11 | Status: SHIPPED | OUTPATIENT
Start: 2024-01-15 | End: 2024-02-14

## 2024-01-15 NOTE — TELEPHONE ENCOUNTER
I called and spoke to patient and advised the medication was sent to her pharmacy. She verbalized understanding.

## 2024-01-15 NOTE — TELEPHONE ENCOUNTER
Pt stopped in the office and is scheduled for an appt in March, however,needs refill now on the Balsalazide Disodium 750mg 3 capsule two times a day.  [facility].  Can enough be sent in to last until her appt.  Thank you!

## 2024-03-22 ENCOUNTER — OFFICE VISIT (OUTPATIENT)
Dept: GASTROENTEROLOGY | Facility: CLINIC | Age: 64
End: 2024-03-22
Payer: COMMERCIAL

## 2024-03-22 VITALS
SYSTOLIC BLOOD PRESSURE: 124 MMHG | WEIGHT: 164.2 LBS | BODY MASS INDEX: 29.09 KG/M2 | HEART RATE: 75 BPM | HEIGHT: 63 IN | DIASTOLIC BLOOD PRESSURE: 68 MMHG

## 2024-03-22 DIAGNOSIS — K51.919 ULCERATIVE COLITIS WITH COMPLICATION, UNSPECIFIED LOCATION (HCC): Primary | ICD-10-CM

## 2024-03-22 DIAGNOSIS — K21.00 GASTROESOPHAGEAL REFLUX DISEASE WITH ESOPHAGITIS WITHOUT HEMORRHAGE: ICD-10-CM

## 2024-03-22 PROCEDURE — 99213 OFFICE O/P EST LOW 20 MIN: CPT | Performed by: INTERNAL MEDICINE

## 2024-03-22 RX ORDER — METOPROLOL SUCCINATE 25 MG/1
25 TABLET, EXTENDED RELEASE ORAL DAILY
COMMUNITY
Start: 2024-02-07

## 2024-03-22 NOTE — PROGRESS NOTES
[facility] - Outpatient Follow-up Note  Georgiana Alfred 63 y.o. female MRN: [id number]  Encounter: [id number]          ASSESSMENT AND PLAN:      1. Ulcerative colitis with complication, unspecified location (HCC)  Will check labs as below in a few months and follow-up in about a year.  Will be due for surveillance colonoscopy in 2025    - CBC; Future  - Comprehensive metabolic panel; Future  - C-reactive protein; Future    2. Gastroesophageal reflux disease with esophagitis without hemorrhage  Well-controlled through dietary discretion    ______________________________________________________________________    SUBJECTIVE: Patient with a history of ulcerative colitis currently where he well-controlled on balsalazide 2.25 g twice a day.  Reflux symptoms controlled per dietary discretion and no longer requiring antacids.  She has had no abdominal pain, nausea or vomiting, fever or chills, jaundice or rash, aphthous ulcers, arthralgias, unexplained weight loss, blood in her stool, change in bowel habit.  Most recent colonoscopy in 2023 with surveillance biopsies negative for dysplasia.      REVIEW OF SYSTEMS:    ROS       Historical Information   Past Medical History:   Diagnosis Date    Anxiety     Colitis, chronic, ulcerative, unspecified complication (HCC)     Colon polyp     GERD (gastroesophageal reflux disease)     Hypertension     Irregular heart beat     a fib  had cardioversion 2008  resolved    Ulcerative colitis (HCC)      Past Surgical History:   Procedure Laterality Date    COLONOSCOPY      HYSTERECTOMY      OVARIAN CYST REMOVAL      WISDOM TOOTH EXTRACTION       Social History   Social History     Substance and Sexual Activity   Alcohol Use Yes    Alcohol/week: 3.0 standard drinks of alcohol    Types: 3 Glasses of wine per week    Comment: social     Social History     Substance and Sexual Activity   Drug Use Never     Social History     Tobacco Use   Smoking Status Former     "Current packs/day: 0.00    Average packs/day: 1 pack/day for 5.0 years (5.0 ttl pk-yrs)    Types: Cigarettes    Start date:     Quit date:     Years since quittin.2   Smokeless Tobacco Never     Family History   Problem Relation Age of Onset    Breast cancer Mother     Breast cancer Maternal Grandmother     Breast cancer Maternal Aunt        Meds/Allergies       Current Outpatient Medications:     acetaminophen (TYLENOL) 500 mg tablet    ALPRAZolam (XANAX) 0.25 mg tablet    aspirin (ECOTRIN LOW STRENGTH) 81 mg EC tablet    atorvastatin (LIPITOR) 20 mg tablet    calcium carbonate (OS-JESSIE) 600 MG tablet    calcium carbonate (TUMS) 500 mg chewable tablet    cetirizine (ZyrTEC) 10 MG chewable tablet    Cholecalciferol 1000 units capsule    Cranberry 1000 MG CAPS    diltiazem (CARDIZEM CD) 240 mg 24 hr capsule    losartan-hydrochlorothiazide (HYZAAR) 100-12.5 MG per tablet    metoprolol succinate (TOPROL-XL) 25 mg 24 hr tablet    multivitamin (THERAGRAN) TABS    Omega-3 Fatty Acids (OMEGA-3 FISH OIL) 1000 MG CAPS    potassium chloride (K-DUR,KLOR-CON) 10 mEq tablet    Probiotic Product (PROBIOTIC-10 PO)    spironolactone (ALDACTONE) 25 mg tablet    B Complex Vitamins (B COMPLEX 1 PO)    balsalazide (COLAZAL) 750 mg capsule    Biotin 1000 MCG tablet    cefuroxime (CEFTIN) 250 mg tablet    cephalexin (KEFLEX) 500 mg capsule    cyanocobalamin (VITAMIN B-12) 100 mcg tablet    famotidine (PEPCID) 10 mg tablet    Guaifenesin 1200 MG TB12    ketoconazole (NIZORAL) 2 % cream    loratadine (CLARITIN) 10 mg tablet    melatonin 3 mg    metoprolol tartrate (LOPRESSOR) 50 mg tablet    No Known Allergies        Objective     Blood pressure 124/68, pulse 75, height 5' 3\" (1.6 m), weight 74.5 kg (164 lb 3.2 oz), not currently breastfeeding. Body mass index is 29.09 kg/m².      PHYSICAL EXAM:      Physical Exam     Lab Results:   No visits with results within 1 Day(s) from this visit.   Latest known visit with results is: "   Hospital Outpatient Visit on 06/05/2023   Component Date Value    Case Report 06/05/2023                      Value:Surgical Pathology Report                         Case: [id number]                                   Authorizing Provider:  Risa Lund MD      Collected:           06/05/2023 0940              Ordering Location:     [facility] Received:            06/06/2023 0835                                     [place]                                                                  Pathologist:           Jet Roberts MD                                                                Specimens:   A) - Large Intestine, Right/Ascending Colon, ASCENDING cold bx...hx of UC                           B) - Large Intestine, Transverse Colon, PROX TRANSVERSE cold bx...hx of UC                          C) - Large Intestine, Transverse Colon, MID TRANSVERSE cold bx...hx of UC                           D) - Large Intestine, Transverse Colon, DISTAL TRANSVERSE cold bx...hx of UC                        E) - Large Intestine, Left/Descending Colon, DESCENDING cold bx...hx of UC                                                    F) - Large Intestine, Sigmoid Colon, SIGMOID cold bx...hx of UC                                     G) - Rectum, RECTO-SIGMOID + RECTUM cold bx...hx of UC                                              H) - Large Intestine, Sigmoid Colon, DISTAL SIGMOID cold snare...polyp x1                  Final Diagnosis 06/05/2023                      Value:This result contains rich text formatting which cannot be displayed here.    Additional Information 06/05/2023                      Value:This result contains rich text formatting which cannot be displayed here.    Synoptic Checklist 06/05/2023                      Value:                            COLON/RECTUM POLYP FORM - GI - H                                                                                     :    Other      Gross  Description 06/05/2023                      Value:This result contains rich text formatting which cannot be displayed here.         Radiology Results:   No results found.

## 2024-06-10 ENCOUNTER — ANNUAL EXAM (OUTPATIENT)
Dept: GYNECOLOGY | Facility: CLINIC | Age: 64
End: 2024-06-10
Payer: COMMERCIAL

## 2024-06-10 VITALS
DIASTOLIC BLOOD PRESSURE: 70 MMHG | SYSTOLIC BLOOD PRESSURE: 100 MMHG | BODY MASS INDEX: 28.46 KG/M2 | WEIGHT: 160.6 LBS | HEIGHT: 63 IN

## 2024-06-10 DIAGNOSIS — Z12.39 ENCOUNTER FOR SCREENING BREAST EXAMINATION: ICD-10-CM

## 2024-06-10 DIAGNOSIS — Z01.419 ENCOUNTER FOR WELL WOMAN EXAM: Primary | ICD-10-CM

## 2024-06-10 DIAGNOSIS — Z12.31 ENCOUNTER FOR SCREENING MAMMOGRAM FOR MALIGNANT NEOPLASM OF BREAST: ICD-10-CM

## 2024-06-10 DIAGNOSIS — Z90.710 HISTORY OF HYSTERECTOMY: ICD-10-CM

## 2024-06-10 PROCEDURE — 99396 PREV VISIT EST AGE 40-64: CPT | Performed by: PHYSICIAN ASSISTANT

## 2024-06-28 NOTE — PROGRESS NOTES
Assessment/Plan:      Diagnoses and all orders for this visit:    Encounter for well woman exam    Encounter for screening breast examination    History of hysterectomy    Encounter for screening mammogram for malignant neoplasm of breast  -     Mammo screening bilateral w 3d & cad; Future          Subjective:     Patient ID: Georgiana Alfred is a 64 y.o. female.    Pt presents for her annual exam today--  She has no complaints  She has no bleeding or pelvic pain--hyster  Bowel and bladder are regular  Sees URO for recurrent UTI--started D mannose  Colonoscopy--23  No breast concerns today  Last mammo--23    No pap today.    Rx mamm  Daily ca, d        Review of Systems   Constitutional:  Negative for chills, fever and unexpected weight change.   HENT:  Negative for ear pain and sore throat.    Eyes:  Negative for pain and visual disturbance.   Respiratory:  Negative for cough and shortness of breath.    Cardiovascular:  Negative for chest pain and palpitations.   Gastrointestinal:  Negative for abdominal pain, blood in stool, constipation, diarrhea and vomiting.   Genitourinary: Negative.  Negative for dysuria and hematuria.   Musculoskeletal:  Negative for arthralgias and back pain.   Skin:  Negative for color change and rash.   Neurological:  Negative for seizures and syncope.   All other systems reviewed and are negative.        Objective:     Physical Exam  Vitals and nursing note reviewed.   Constitutional:       Appearance: Normal appearance. She is well-developed.   HENT:      Head: Normocephalic and atraumatic.   Chest:   Breasts:     Right: No inverted nipple, mass, nipple discharge or skin change.      Left: No inverted nipple, mass, nipple discharge or skin change.   Abdominal:      Palpations: Abdomen is soft.   Genitourinary:     Exam position: Supine.      Labia:         Right: No rash, tenderness or lesion.         Left: No rash, tenderness or lesion.       Vagina: Normal.      Cervix: No cervical  motion tenderness, discharge or friability.      Uterus: Absent.       Adnexa:         Right: No mass, tenderness or fullness.          Left: No mass, tenderness or fullness.     Musculoskeletal:      Cervical back: Normal range of motion.   Lymphadenopathy:      Lower Body: No right inguinal adenopathy. No left inguinal adenopathy.   Neurological:      Mental Status: She is alert.

## 2024-11-27 ENCOUNTER — TELEPHONE (OUTPATIENT)
Age: 64
End: 2024-11-27

## 2024-11-27 NOTE — TELEPHONE ENCOUNTER
AVS and XR order was faxed to [facility] at [phone]. Called Sandi,  and left detailed vm to obtain follow up VV appt.    New Patient    What is the reason for the patient's appointment?: Recurring UTIs     What office location does the patient prefer?:     Does patient have Imaging/Lab Results:  Urine Testing    Have patient records been requested?:  If No, are the records showing in Epic: Yes    INSURANCE:   Do we accept the patient's insurance or is the patient Self-Pay?: Yes    Insurance Provider: [employer]   Plan Type/Number:   Member ID#: [id number]     HISTORY:   Has the patient had any previous Urologist(s)?: No    Was the patient seen in the ED?: NO    Does the patient have a personal history of cancer?: N/A    Pt refused AP appointment, scheduled for 2/18/24 with .

## 2024-11-29 ENCOUNTER — NURSE TRIAGE (OUTPATIENT)
Age: 64
End: 2024-11-29

## 2024-11-29 NOTE — TELEPHONE ENCOUNTER
Regarding: pt having uti symptoms past 2 weeks finished meds and has sensation  ----- Message from Edelmira RUIZ sent at 11/29/2024  3:38 PM EST -----  Patient called and is a patient of Sandra and she had uti symptoms for the past 2 weeks and [facility] and 4 days later found out no bacteria a. She was  told her to finish macrobid and she still has sensation and thought was yeast infection from rx and used monistat and does not feel right .  Please call patient back at [phone]. Thank you

## 2024-11-29 NOTE — TELEPHONE ENCOUNTER
"Georgiana was evaluated 2 weeks ago at urgent care and treated with macrobid for symptoms of UTI. She states she was advised no bacteria on culture but to finish ABX.  She completed ABX, pressure,burning sensation persisted. She treated with monistat 3 OTC- symptoms are still persisting.     Appt provided for Monday at 10am in [facility].    Advised push fluids/cranberry juice in meantime. Avoid caffeine    Reason for Disposition   All other urine symptoms    Answer Assessment - Initial Assessment Questions  1. SYMPTOM: \"What's the main symptom you're concerned about?\" (e.g., frequency, incontinence)      Pressure at end of stream , slight burning at end of stream   2. ONSET: \"When did the  symptom  start?\"      2 weeks ago  3. PAIN: \"Is there any pain?\" If Yes, ask: \"How bad is it?\" (Scale: 1-10; mild, moderate, severe)      More like discomfort  4. CAUSE: \"What do you think is causing the symptoms?\"      unsure  5. OTHER SYMPTOMS: \"Do you have any other symptoms?\" (e.g., blood in urine, fever, flank pain, pain with urination)      Finished macrobid, denies  6. PREGNANCY: \"Is there any chance you are pregnant?\" \"When was your last menstrual period?\"      Post menopausal    Protocols used: Urinary Symptoms-Adult-AH    "

## 2024-12-02 ENCOUNTER — OFFICE VISIT (OUTPATIENT)
Dept: OBGYN CLINIC | Facility: CLINIC | Age: 64
End: 2024-12-02
Payer: COMMERCIAL

## 2024-12-02 VITALS
BODY MASS INDEX: 29.3 KG/M2 | WEIGHT: 165.4 LBS | DIASTOLIC BLOOD PRESSURE: 70 MMHG | SYSTOLIC BLOOD PRESSURE: 108 MMHG | HEIGHT: 63 IN

## 2024-12-02 DIAGNOSIS — B37.31 YEAST VAGINITIS: ICD-10-CM

## 2024-12-02 DIAGNOSIS — N89.8 VAGINAL ITCHING: Primary | ICD-10-CM

## 2024-12-02 PROCEDURE — 99213 OFFICE O/P EST LOW 20 MIN: CPT | Performed by: PHYSICIAN ASSISTANT

## 2024-12-02 RX ORDER — FLUCONAZOLE 150 MG/1
150 TABLET ORAL ONCE
Qty: 1 TABLET | Refills: 1 | Status: SHIPPED | OUTPATIENT
Start: 2024-12-02 | End: 2024-12-02

## 2024-12-02 RX ORDER — TERCONAZOLE 4 MG/G
1 CREAM VAGINAL
Qty: 45 G | Refills: 1 | Status: SHIPPED | OUTPATIENT
Start: 2024-12-02

## 2024-12-05 NOTE — PROGRESS NOTES
Assessment/Plan:      Diagnoses and all orders for this visit:    Vaginal itching    Yeast vaginitis  -     fluconazole (DIFLUCAN) 150 mg tablet; Take 1 tablet (150 mg total) by mouth once for 1 dose  -     terconazole (TERAZOL 7) 0.4 % vaginal cream; Insert 1 applicator into the vagina daily at bedtime          Subjective:     Patient ID: Georgiana Alfred is a 64 y.o. female.    Pt presents for a problem today  She complains of vaginal itching/irritation x few days  Is s/p ABX for UTI   UTI sxs improved   No discharge  No bleeding        Review of Systems   Constitutional:  Negative for chills, fever and unexpected weight change.   HENT:  Negative for ear pain and sore throat.    Eyes:  Negative for pain and visual disturbance.   Respiratory:  Negative for cough and shortness of breath.    Cardiovascular:  Negative for chest pain and palpitations.   Gastrointestinal:  Negative for abdominal pain, blood in stool, constipation, diarrhea and vomiting.   Genitourinary:  Positive for vaginal pain. Negative for dysuria, hematuria, vaginal bleeding and vaginal discharge.   Musculoskeletal:  Negative for arthralgias and back pain.   Skin:  Negative for color change and rash.   Neurological:  Negative for seizures and syncope.   All other systems reviewed and are negative.        Objective:     Physical Exam  Vitals and nursing note reviewed.   Constitutional:       Appearance: Normal appearance. She is well-developed.   Genitourinary:     General: Normal vulva.      Exam position: Supine.      Labia:         Right: No rash or lesion.         Left: No rash or lesion.       Vagina: Normal.      Cervix: No cervical motion tenderness, discharge or friability.   Lymphadenopathy:      Lower Body: No right inguinal adenopathy. No left inguinal adenopathy.   Neurological:      Mental Status: She is alert.

## 2025-01-08 ENCOUNTER — TELEPHONE (OUTPATIENT)
Dept: GASTROENTEROLOGY | Facility: CLINIC | Age: 65
End: 2025-01-08

## 2025-01-08 DIAGNOSIS — K51.90 ULCERATIVE COLITIS WITHOUT COMPLICATIONS, UNSPECIFIED LOCATION (HCC): ICD-10-CM

## 2025-01-08 RX ORDER — BALSALAZIDE DISODIUM 750 MG/1
2250 CAPSULE ORAL 2 TIMES DAILY
Qty: 180 CAPSULE | Refills: 7 | Status: SHIPPED | OUTPATIENT
Start: 2025-01-08 | End: 2025-02-07

## 2025-01-08 NOTE — TELEPHONE ENCOUNTER
Please call and inform patient that prescription with refills was sent to [facility] pharmacy.  Thank you

## 2025-04-11 ENCOUNTER — TELEPHONE (OUTPATIENT)
Age: 65
End: 2025-04-11

## 2025-04-11 ENCOUNTER — PREP FOR PROCEDURE (OUTPATIENT)
Age: 65
End: 2025-04-11

## 2025-04-11 DIAGNOSIS — K57.90 DIVERTICULAR DISEASE: Primary | ICD-10-CM

## 2025-04-11 NOTE — TELEPHONE ENCOUNTER
Scheduled date of colonoscopy (as of today): 6/12/25    Physician performing colonoscopy: Dr. Neumann    Location of colonoscopy: [facility]    Bowel prep reviewed with patient: [email or web address]  ANGEL/EUSEBIA

## 2025-04-11 NOTE — TELEPHONE ENCOUNTER
04/11/25  Screened by: Julia Silveira    Referring Provider     Pre- Screening:     There is no height or weight on file to calculate BMI. 29.30  Has patient been referred for a routine screening Colonoscopy? yes  Is the patient between 45-75 years old? yes      Previous Colonoscopy yes   If yes:    Date: 2023    Facility:     Reason:         Does the patient want to see a Gastroenterologist prior to their procedure OR are they having any GI symptoms? no    Has the patient been hospitalized or had abdominal surgery in the past 6 months? no    Does the patient use supplemental oxygen? no    Does the patient take Coumadin, Lovenox, Plavix, Elliquis, Xarelto, or other blood thinning medication? no    Has the patient had a stroke, cardiac event, or stent placed in the past year? no        If patient is between 45yrs - 49yrs, please advise patient that we will have to confirm benefits & coverage with their insurance company for a routine screening colonoscopy.

## 2025-05-29 ENCOUNTER — ANESTHESIA (OUTPATIENT)
Dept: ANESTHESIOLOGY | Facility: HOSPITAL | Age: 65
End: 2025-05-29

## 2025-05-29 ENCOUNTER — ANESTHESIA EVENT (OUTPATIENT)
Dept: ANESTHESIOLOGY | Facility: HOSPITAL | Age: 65
End: 2025-05-29

## 2025-06-11 RX ORDER — SODIUM CHLORIDE, SODIUM LACTATE, POTASSIUM CHLORIDE, CALCIUM CHLORIDE 600; 310; 30; 20 MG/100ML; MG/100ML; MG/100ML; MG/100ML
75 INJECTION, SOLUTION INTRAVENOUS CONTINUOUS
Status: CANCELLED | OUTPATIENT
Start: 2025-06-11

## 2025-06-12 ENCOUNTER — ANESTHESIA EVENT (OUTPATIENT)
Dept: GASTROENTEROLOGY | Facility: AMBULARY SURGERY CENTER | Age: 65
End: 2025-06-12
Payer: MEDICARE

## 2025-06-12 ENCOUNTER — HOSPITAL ENCOUNTER (OUTPATIENT)
Dept: GASTROENTEROLOGY | Facility: AMBULARY SURGERY CENTER | Age: 65
Setting detail: OUTPATIENT SURGERY
End: 2025-06-12
Attending: INTERNAL MEDICINE
Payer: MEDICARE

## 2025-06-12 VITALS
DIASTOLIC BLOOD PRESSURE: 72 MMHG | TEMPERATURE: 97.3 F | RESPIRATION RATE: 18 BRPM | SYSTOLIC BLOOD PRESSURE: 117 MMHG | HEART RATE: 68 BPM | OXYGEN SATURATION: 100 %

## 2025-06-12 DIAGNOSIS — K51.919 ULCERATIVE COLITIS WITH COMPLICATION, UNSPECIFIED LOCATION (HCC): ICD-10-CM

## 2025-06-12 DIAGNOSIS — K57.90 DIVERTICULAR DISEASE: ICD-10-CM

## 2025-06-12 PROBLEM — K51.90 ULCERATIVE COLITIS (HCC): Status: ACTIVE | Noted: 2025-06-12

## 2025-06-12 PROCEDURE — 88305 TISSUE EXAM BY PATHOLOGIST: CPT | Performed by: PATHOLOGY

## 2025-06-12 PROCEDURE — 45380 COLONOSCOPY AND BIOPSY: CPT | Performed by: INTERNAL MEDICINE

## 2025-06-12 RX ORDER — PROPOFOL 10 MG/ML
INJECTION, EMULSION INTRAVENOUS CONTINUOUS PRN
Status: DISCONTINUED | OUTPATIENT
Start: 2025-06-12 | End: 2025-06-12

## 2025-06-12 RX ORDER — LIDOCAINE HYDROCHLORIDE 10 MG/ML
INJECTION, SOLUTION EPIDURAL; INFILTRATION; INTRACAUDAL; PERINEURAL AS NEEDED
Status: DISCONTINUED | OUTPATIENT
Start: 2025-06-12 | End: 2025-06-12

## 2025-06-12 RX ORDER — FEXOFENADINE HCL 60 MG/1
60 TABLET, FILM COATED ORAL DAILY
COMMUNITY

## 2025-06-12 RX ORDER — SODIUM CHLORIDE, SODIUM LACTATE, POTASSIUM CHLORIDE, CALCIUM CHLORIDE 600; 310; 30; 20 MG/100ML; MG/100ML; MG/100ML; MG/100ML
75 INJECTION, SOLUTION INTRAVENOUS CONTINUOUS
Status: DISCONTINUED | OUTPATIENT
Start: 2025-06-12 | End: 2025-06-16 | Stop reason: HOSPADM

## 2025-06-12 RX ORDER — PROPOFOL 10 MG/ML
INJECTION, EMULSION INTRAVENOUS AS NEEDED
Status: DISCONTINUED | OUTPATIENT
Start: 2025-06-12 | End: 2025-06-12

## 2025-06-12 RX ADMIN — SODIUM CHLORIDE, SODIUM LACTATE, POTASSIUM CHLORIDE, AND CALCIUM CHLORIDE 75 ML/HR: .6; .31; .03; .02 INJECTION, SOLUTION INTRAVENOUS at 07:35

## 2025-06-12 RX ADMIN — PROPOFOL 120 MCG/KG/MIN: 10 INJECTION, EMULSION INTRAVENOUS at 08:15

## 2025-06-12 RX ADMIN — PROPOFOL 100 MG: 10 INJECTION, EMULSION INTRAVENOUS at 08:13

## 2025-06-12 RX ADMIN — LIDOCAINE HYDROCHLORIDE 50 MG: 10 INJECTION, SOLUTION EPIDURAL; INFILTRATION; INTRACAUDAL; PERINEURAL at 08:13

## 2025-06-12 NOTE — ANESTHESIA POSTPROCEDURE EVALUATION
Post-Op Assessment Note    CV Status:  Stable    Pain management: adequate       Mental Status:  Alert and awake   Hydration Status:  Euvolemic and stable   PONV Controlled:  None   Airway Patency:  Patent and adequate     Post Op Vitals Reviewed: Yes    No anethesia notable event occurred.    Staff: Anesthesiologist, with CRNAs           Last Filed PACU Vitals:  Vitals Value Taken Time   Temp     Pulse 68 06/12/25 08:55   /72 06/12/25 08:55   Resp 18 06/12/25 08:55   SpO2 100 % 06/12/25 08:55       Modified Jacoby:     Vitals Value Taken Time   Activity 2 06/12/25 08:40   Respiration 2 06/12/25 08:40   Circulation 2 06/12/25 08:40   Consciousness 1 06/12/25 08:40   Oxygen Saturation 2 06/12/25 08:40     Modified Jacoby Score: 9

## 2025-06-12 NOTE — ANESTHESIA PREPROCEDURE EVALUATION
Procedure:  COLONOSCOPY    Relevant Problems   ANESTHESIA (within normal limits)   (-) History of anesthesia complications      CARDIO   (+) HTN (hypertension)      ENDO (within normal limits)      GI/HEPATIC   (+) Gastroesophageal reflux disease      /RENAL (within normal limits)      HEMATOLOGY (within normal limits)      MUSCULOSKELETAL (within normal limits)      NEURO/PSYCH (within normal limits)      PULMONARY (within normal limits)      FEN/Gastrointestinal   (+) Ulcerative colitis (HCC)        Physical Exam    Airway     Mallampati score: I  TM Distance: >3 FB  Neck ROM: full  Mouth opening: >= 4 cm      Cardiovascular  Rhythm: regular, Rate: normalCardiovascular exam normal    Dental   No notable dental hx     Pulmonary  Pulmonary exam normal Breath sounds clear to auscultation    Neurological    She appears awake and alert.      Other Findings  post-pubertal.      Anesthesia Plan  ASA Score- 2     Anesthesia Type- IV sedation with anesthesia with ASA Monitors.         Additional Monitors:     Airway Plan:            Plan Factors-Exercise tolerance (METS): >4 METS.    Chart reviewed.   Existing labs reviewed. Patient summary reviewed.    Patient is not a current smoker.  Patient did not smoke on day of surgery.            Induction- intravenous.    Postoperative Plan- .   Monitoring Plan - Monitoring plan - standard ASA monitoring      Perioperative Resuscitation Plan - Level 1 - Full Code.       Informed Consent- Anesthetic plan and risks discussed with patient.  I personally reviewed this patient with the CRNA. Discussed and agreed on the Anesthesia Plan with the CRNA..      NPO Status:  Vitals Value Taken Time   Date of last liquid 06/12/25 06/12/25 07:17   Time of last liquid 0445 06/12/25 07:17   Date of last solid 06/10/25 06/12/25 07:17   Time of last solid 1800 06/12/25 07:17       NPO verified.  NKDA.    Plan:  IV sedation, GA backup    Benefits and risks of sedation were discussed with the  patient including possibility of recall under sedation and the potential for conversion to general anesthesia if necessary.  All questions were answered.  Anesthesia consent was obtained from the patient.

## 2025-06-12 NOTE — H&P
History and Physical -  [facility]  Georgiana Alfred 64 y.o. female MRN: [id number]                  HPI: Georgiana Alfred is a 64 y.o. year old female who presents for surveillance of ulcerative colitis      REVIEW OF SYSTEMS: Per the HPI, and otherwise unremarkable.    Historical Information   Past Medical History[1]  Past Surgical History[2]  Social History   Social History     Substance and Sexual Activity   Alcohol Use Yes    Alcohol/week: 3.0 standard drinks of alcohol    Types: 3 Glasses of wine per week    Comment: social     Social History     Substance and Sexual Activity   Drug Use Never     Tobacco Use History[3]  Family History[4]    Meds/Allergies     Current Medications[5]    Allergies[6]    Objective     /72   Pulse 65   Temp (!) 97.3 °F (36.3 °C) (Temporal)   Resp 18   LMP  (LMP Unknown)   SpO2 100%       PHYSICAL EXAM    Gen: NAD  Head: NCAT  CV: RRR  CHEST: Clear  ABD: soft, NT/ND  EXT: no edema      ASSESSMENT/PLAN:  This is a 64 y.o. year old female here for colonoscopy, and she is stable and optimized for her procedure.             [1]   Past Medical History:  Diagnosis Date    Anxiety     Colitis, chronic, ulcerative, unspecified complication (HCC)     Colon polyp     GERD (gastroesophageal reflux disease)     Hypertension     Irregular heart beat     a fib  had cardioversion   resolved    Ulcerative colitis (HCC)    [2]   Past Surgical History:  Procedure Laterality Date    COLONOSCOPY      HYSTERECTOMY      OVARIAN CYST REMOVAL      WISDOM TOOTH EXTRACTION     [3]   Social History  Tobacco Use   Smoking Status Former    Current packs/day: 0.00    Average packs/day: 1 pack/day for 5.0 years (5.0 ttl pk-yrs)    Types: Cigarettes    Start date:     Quit date:     Years since quittin.4   Smokeless Tobacco Never   [4]   Family History  Problem Relation Name Age of Onset    Breast cancer Mother      Breast cancer Maternal Grandmother      Breast cancer  Maternal Aunt     [5]   Current Outpatient Medications:     acetaminophen (TYLENOL) 500 mg tablet    aspirin (ECOTRIN LOW STRENGTH) 81 mg EC tablet    atorvastatin (LIPITOR) 20 mg tablet    balsalazide (COLAZAL) 750 mg capsule    calcium carbonate (OS-JESSIE) 600 MG tablet    calcium carbonate (TUMS) 500 mg chewable tablet    Cholecalciferol 1000 units capsule    Cranberry 1000 MG CAPS    diltiazem (CARDIZEM CD) 240 mg 24 hr capsule    fexofenadine (ALLEGRA) 60 MG tablet    losartan-hydrochlorothiazide (HYZAAR) 100-12.5 MG per tablet    melatonin 3 mg    metoprolol tartrate (LOPRESSOR) 50 mg tablet    multivitamin (THERAGRAN) TABS    Omega-3 Fatty Acids (OMEGA-3 FISH OIL) 1000 MG CAPS    potassium chloride (K-DUR,KLOR-CON) 10 mEq tablet    Probiotic Product (PROBIOTIC-10 PO)    spironolactone (ALDACTONE) 25 mg tablet    ALPRAZolam (XANAX) 0.25 mg tablet    metoprolol succinate (TOPROL-XL) 25 mg 24 hr tablet    Current Facility-Administered Medications:     lactated ringers infusion, 75 mL/hr, Intravenous, Continuous, 75 mL/hr at 06/12/25 0735  [6] No Known Allergies

## 2025-06-12 NOTE — ANESTHESIA POSTPROCEDURE EVALUATION
Post-Op Assessment Note    CV Status:  Stable  Pain Score: 0    Pain management: adequate       Mental Status:  Awake   Hydration Status:  Stable   PONV Controlled:  None   Airway Patency:  Patent     Post Op Vitals Reviewed: Yes    No anethesia notable event occurred.    Staff: Anesthesiologist, CRNA           Last Filed PACU Vitals:  Vitals Value Taken Time   Temp     Pulse 66    /60    Resp 14    SpO2 98

## 2025-06-16 PROCEDURE — 88305 TISSUE EXAM BY PATHOLOGIST: CPT | Performed by: PATHOLOGY

## 2025-06-24 ENCOUNTER — OFFICE VISIT (OUTPATIENT)
Dept: URGENT CARE | Facility: CLINIC | Age: 65
End: 2025-06-24
Payer: MEDICARE

## 2025-06-24 VITALS
DIASTOLIC BLOOD PRESSURE: 80 MMHG | WEIGHT: 158 LBS | SYSTOLIC BLOOD PRESSURE: 120 MMHG | HEART RATE: 80 BPM | OXYGEN SATURATION: 97 % | RESPIRATION RATE: 18 BRPM | BODY MASS INDEX: 26.98 KG/M2 | HEIGHT: 64 IN | TEMPERATURE: 97.8 F

## 2025-06-24 DIAGNOSIS — J01.00 ACUTE NON-RECURRENT MAXILLARY SINUSITIS: Primary | ICD-10-CM

## 2025-06-24 PROCEDURE — 99213 OFFICE O/P EST LOW 20 MIN: CPT | Performed by: FAMILY MEDICINE

## 2025-06-24 PROCEDURE — G2211 COMPLEX E/M VISIT ADD ON: HCPCS | Performed by: FAMILY MEDICINE

## 2025-06-24 RX ORDER — AZELASTINE 1 MG/ML
1 SPRAY, METERED NASAL 2 TIMES DAILY
Qty: 1 ML | Refills: 0 | Status: SHIPPED | OUTPATIENT
Start: 2025-06-24

## 2025-06-24 NOTE — PROGRESS NOTES
[facility]        NAME: Georgiana Alfred is a 65 y.o. female  : 1960    MRN: [id number]  DATE: 2025  TIME: 11:15 AM    Assessment and Plan   Acute non-recurrent maxillary sinusitis [J01.00]  1. Acute non-recurrent maxillary sinusitis  amoxicillin-clavulanate (AUGMENTIN) 875-125 mg per tablet    azelastine (ASTELIN) 0.1 % nasal spray        Constellation of symptoms seem to indicate uncontrolled seasonal allergies.  There is a mild possibility of bacterial involvement for which Augmentin has been prescribed.  Azelastine nasal spray to open up the nasal passages and decrease rhinorrhea/postnasal drip.  Recommended clearing the nasal cavity with nasal rinsing prior to nasal instillation.    Patient Instructions     Follow up with PCP in 3-5 days.  Proceed to  ER if symptoms worsen.    If tests have been performed at [facility], our office will contact you with results if changes need to be made to the care plan discussed with you at the visit.  [facility]    Chief Complaint     Chief Complaint   Patient presents with    Cold Like Symptoms     Sinus pain and pressure  that started over the weekend.          History of Present Illness       65-year-old female with pertinent history of seasonal allergies on [place] presents today with about 4 days of sinus pressure, nasal congestion, rhinorrhea, sneezing and some postnasal drip.  Reports having new onset headaches and disequilibrium with head movement.  Denies any obvious fevers, chills, chest pain, abdominal symptoms or any obvious sick contacts.      Review of Systems   Review of Systems   Constitutional:  Negative for chills and fever.   HENT:  Positive for congestion, postnasal drip, rhinorrhea, sinus pressure and sneezing.    Respiratory:  Positive for cough and chest tightness. Negative for shortness of breath.    Cardiovascular:  Negative for chest pain.   Gastrointestinal:  Negative for abdominal  "pain and nausea.   Allergic/Immunologic: Positive for environmental allergies.   Neurological:  Positive for dizziness (disequilibrium with head movement) and headaches.     Current Medications     Current Medications[1]    Current Allergies     Allergies as of 06/24/2025    (No Known Allergies)            The following portions of the patient's history were reviewed and updated as appropriate: allergies, current medications, past family history, past medical history, past social history, past surgical history and problem list.     Past Medical History[2]    Past Surgical History[3]    Family History[4]      Medications have been verified.        Objective   /80   Pulse 80   Temp 97.8 °F (36.6 °C)   Resp 18   Ht 5' 4\" (1.626 m)   Wt 71.7 kg (158 lb)   LMP  (LMP Unknown)   SpO2 97%   BMI 27.12 kg/m²   No LMP recorded (lmp unknown). Patient has had a hysterectomy.       Physical Exam     Physical Exam  Vitals and nursing note reviewed.   Constitutional:       General: She is in acute distress.      Appearance: Normal appearance. She is normal weight. She is not ill-appearing, toxic-appearing or diaphoretic.   HENT:      Head: Normocephalic and atraumatic.      Right Ear: Tympanic membrane, ear canal and external ear normal. There is no impacted cerumen.      Left Ear: Tympanic membrane, ear canal and external ear normal. There is no impacted cerumen.      Nose: Congestion and rhinorrhea present.      Mouth/Throat:      Mouth: Mucous membranes are moist.      Pharynx: No posterior oropharyngeal erythema.     Eyes:      General:         Right eye: No discharge.         Left eye: No discharge.      Conjunctiva/sclera: Conjunctivae normal.       Cardiovascular:      Rate and Rhythm: Normal rate and regular rhythm.   Pulmonary:      Effort: Pulmonary effort is normal. No respiratory distress.      Breath sounds: Normal breath sounds. No wheezing, rhonchi or rales.     Skin:     General: Skin is warm.      " Findings: No erythema.     Neurological:      General: No focal deficit present.      Mental Status: She is alert and oriented to person, place, and time.     Psychiatric:         Mood and Affect: Mood normal.         Behavior: Behavior normal.         Thought Content: Thought content normal.         Judgment: Judgment normal.                        [1]   Current Outpatient Medications:     acetaminophen (TYLENOL) 500 mg tablet, Take 500 mg by mouth every 6 (six) hours, Disp: , Rfl:     ALPRAZolam (XANAX) 0.25 mg tablet, Take 0.25 mg by mouth, Disp: , Rfl:     amoxicillin-clavulanate (AUGMENTIN) 875-125 mg per tablet, Take 1 tablet by mouth every 12 (twelve) hours for 5 days, Disp: 10 tablet, Rfl: 0    aspirin (ECOTRIN LOW STRENGTH) 81 mg EC tablet, Take 81 mg by mouth in the morning., Disp: , Rfl:     atorvastatin (LIPITOR) 20 mg tablet, Take 20 mg by mouth, Disp: , Rfl:     azelastine (ASTELIN) 0.1 % nasal spray, 1 spray into each nostril 2 (two) times a day Use in each nostril as directed, Disp: 1 mL, Rfl: 0    calcium carbonate (OS-JESSIE) 600 MG tablet, Take 600 mg by mouth in the morning and 600 mg in the evening. Take with meals., Disp: , Rfl:     calcium carbonate (TUMS) 500 mg chewable tablet, Chew 1 tablet in the morning., Disp: , Rfl:     Cholecalciferol 1000 units capsule, Take 1,000 Units by mouth, Disp: , Rfl:     Cranberry 1000 MG CAPS, Take by mouth, Disp: , Rfl:     diltiazem (CARDIZEM CD) 240 mg 24 hr capsule, , Disp: , Rfl:     fexofenadine (ALLEGRA) 60 MG tablet, Take 60 mg by mouth daily, Disp: , Rfl:     losartan-hydrochlorothiazide (HYZAAR) 100-12.5 MG per tablet, , Disp: , Rfl:     melatonin 3 mg, Take by mouth, Disp: , Rfl:     metoprolol succinate (TOPROL-XL) 25 mg 24 hr tablet, Take 25 mg by mouth in the morning., Disp: , Rfl:     multivitamin (THERAGRAN) TABS, Take 1 tablet by mouth in the morning., Disp: , Rfl:     Omega-3 Fatty Acids (OMEGA-3 FISH OIL) 1000 MG CAPS, Take by mouth, Disp: ,  Rfl:     potassium chloride (K-DUR,KLOR-CON) 10 mEq tablet, Take 30 mEq by mouth in the morning and 30 mEq in the evening., Disp: , Rfl:     Probiotic Product (PROBIOTIC-10 PO), Take by mouth, Disp: , Rfl:     spironolactone (ALDACTONE) 25 mg tablet, , Disp: , Rfl:     balsalazide (COLAZAL) 750 mg capsule, Take 3 capsules (2,250 mg total) by mouth 2 (two) times a day, Disp: 180 capsule, Rfl: 7    metoprolol tartrate (LOPRESSOR) 50 mg tablet, Take 50 mg by mouth, Disp: , Rfl:   [2]   Past Medical History:  Diagnosis Date    Anxiety     Colitis, chronic, ulcerative, unspecified complication (HCC)     Colon polyp     GERD (gastroesophageal reflux disease)     Hypertension     Irregular heart beat     a fib  had cardioversion 2008  resolved    Ulcerative colitis (HCC)    [3]   Past Surgical History:  Procedure Laterality Date    COLONOSCOPY      HYSTERECTOMY      OVARIAN CYST REMOVAL      WISDOM TOOTH EXTRACTION     [4]   Family History  Problem Relation Name Age of Onset    Breast cancer Mother      Breast cancer Maternal Grandmother      Breast cancer Maternal Aunt

## 2025-06-30 ENCOUNTER — ANNUAL EXAM (OUTPATIENT)
Dept: OBGYN CLINIC | Facility: CLINIC | Age: 65
End: 2025-06-30
Payer: MEDICARE

## 2025-06-30 VITALS
WEIGHT: 160 LBS | HEIGHT: 64 IN | SYSTOLIC BLOOD PRESSURE: 102 MMHG | DIASTOLIC BLOOD PRESSURE: 60 MMHG | BODY MASS INDEX: 27.31 KG/M2

## 2025-06-30 DIAGNOSIS — R10.2 PELVIC PRESSURE IN FEMALE: ICD-10-CM

## 2025-06-30 DIAGNOSIS — Z01.419 ENCOUNTER FOR WELL WOMAN EXAM: Primary | ICD-10-CM

## 2025-06-30 DIAGNOSIS — N95.2 ATROPHY OF VAGINA: ICD-10-CM

## 2025-06-30 DIAGNOSIS — Z12.31 ENCOUNTER FOR SCREENING MAMMOGRAM FOR MALIGNANT NEOPLASM OF BREAST: ICD-10-CM

## 2025-06-30 DIAGNOSIS — Z12.39 ENCOUNTER FOR SCREENING BREAST EXAMINATION: ICD-10-CM

## 2025-06-30 DIAGNOSIS — Z90.710 HISTORY OF HYSTERECTOMY: ICD-10-CM

## 2025-06-30 DIAGNOSIS — Z78.0 POSTMENOPAUSAL: ICD-10-CM

## 2025-06-30 PROCEDURE — G0101 CA SCREEN;PELVIC/BREAST EXAM: HCPCS | Performed by: PHYSICIAN ASSISTANT

## 2025-06-30 RX ORDER — POTASSIUM CHLORIDE 1500 MG/1
1 TABLET, EXTENDED RELEASE ORAL DAILY
COMMUNITY
Start: 2025-06-17

## 2025-06-30 NOTE — PROGRESS NOTES
":  Assessment & Plan  Encounter for well woman exam         Encounter for screening breast examination         History of hysterectomy         Postmenopausal    Orders:    Ambulatory Referral to Urogynecology; Future    Atrophy of vagina    Orders:    Ambulatory Referral to Urogynecology; Future    Pelvic pressure in female    Orders:    Ambulatory Referral to Urogynecology; Future    Encounter for screening mammogram for malignant neoplasm of breast    Orders:    Mammo screening bilateral w 3d and cad; Future        History of Present Illness     Georgiana Alfred is a 65 y.o. female   Pt presents for her annual exam today--  She has no complaints except pelvic pressure, pelvic floor weakness  She has no bleeding or pelvic pain--hyster  Bowel and bladder are regular  Colonoscopy--25  No breast concerns today  Last mammo--24    No pap today.    Rx mammo  Rx urogyn  Daily mvi, ca, d      Review of Systems   Constitutional:  Negative for chills, fever and unexpected weight change.   HENT:  Negative for ear pain and sore throat.    Eyes:  Negative for pain and visual disturbance.   Respiratory:  Negative for cough and shortness of breath.    Cardiovascular:  Negative for chest pain and palpitations.   Gastrointestinal:  Negative for abdominal pain, blood in stool, constipation, diarrhea and vomiting.   Genitourinary: Negative.  Negative for dysuria and hematuria.   Musculoskeletal:  Negative for arthralgias and back pain.   Skin:  Negative for color change and rash.   Neurological:  Negative for seizures and syncope.   All other systems reviewed and are negative.    Objective   /60   Ht 5' 4\" (1.626 m)   Wt 72.6 kg (160 lb)   LMP  (LMP Unknown)   BMI 27.46 kg/m²      Physical Exam  Vitals and nursing note reviewed.   Constitutional:       General: She is not in acute distress.     Appearance: Normal appearance. She is well-developed.   HENT:      Head: Normocephalic and atraumatic.     Eyes:      " Conjunctiva/sclera: Conjunctivae normal.       Cardiovascular:      Rate and Rhythm: Normal rate and regular rhythm.      Heart sounds: No murmur heard.  Pulmonary:      Effort: Pulmonary effort is normal. No respiratory distress.      Breath sounds: Normal breath sounds.   Chest:   Breasts:     Right: Normal.      Left: Normal.   Abdominal:      Palpations: Abdomen is soft.      Tenderness: There is no abdominal tenderness.   Genitourinary:     General: Normal vulva.      Vagina: Normal.      Uterus: Absent.       Adnexa: Right adnexa normal and left adnexa normal.      Rectum: Normal.     Musculoskeletal:         General: No swelling.      Cervical back: Neck supple.     Skin:     General: Skin is warm and dry.      Capillary Refill: Capillary refill takes less than 2 seconds.     Neurological:      Mental Status: She is alert.     Psychiatric:         Mood and Affect: Mood normal.

## 2025-07-16 ENCOUNTER — OFFICE VISIT (OUTPATIENT)
Dept: GASTROENTEROLOGY | Facility: CLINIC | Age: 65
End: 2025-07-16
Payer: MEDICARE

## 2025-07-16 VITALS
SYSTOLIC BLOOD PRESSURE: 103 MMHG | BODY MASS INDEX: 27.31 KG/M2 | WEIGHT: 160 LBS | DIASTOLIC BLOOD PRESSURE: 66 MMHG | HEIGHT: 64 IN | HEART RATE: 69 BPM

## 2025-07-16 DIAGNOSIS — K51.90 ULCERATIVE COLITIS WITHOUT COMPLICATIONS, UNSPECIFIED LOCATION (HCC): ICD-10-CM

## 2025-07-16 PROCEDURE — 99213 OFFICE O/P EST LOW 20 MIN: CPT | Performed by: INTERNAL MEDICINE

## 2025-07-16 RX ORDER — BALSALAZIDE DISODIUM 750 MG/1
2250 CAPSULE ORAL 2 TIMES DAILY
Qty: 540 CAPSULE | Refills: 1 | Status: SHIPPED | OUTPATIENT
Start: 2025-07-16

## 2025-07-16 NOTE — ASSESSMENT & PLAN NOTE
Continue balsalazide at its current dosage.  Will plan repeat surveillance colonoscopy in 3 years.  Orders:    balsalazide (COLAZAL) 750 mg capsule; Take 3 capsules (2,250 mg total) by mouth in the morning and 3 capsules (2,250 mg total) before bedtime.

## 2025-07-16 NOTE — PROGRESS NOTES
"Name: Georgiana Alfred      : 1960      MRN: [id number]  Encounter Provider: Agustin Neumann MD  Encounter Date: 2025   Encounter department: [facility] [place] [id number]  :  Assessment & Plan  Ulcerative colitis without complications, unspecified location (HCC)  Continue balsalazide at its current dosage.  Will plan repeat surveillance colonoscopy in 3 years.  Orders:    balsalazide (COLAZAL) 750 mg capsule; Take 3 capsules (2,250 mg total) by mouth in the morning and 3 capsules (2,250 mg total) before bedtime.        History of Present Illness   Georgiana Alfred is a 65 y.o. female who presents in follow-up of ulcerative colitis.  Recently underwent surveillance colonoscopy with biopsies which were entirely benign.  Incomplete symptomatic remission on balsalazide.  No complaints at this time.  Her reflux symptoms are well-controlled per dietary discretion.  Takes a pantoprazole rarely as needed if she eats something she knows will disagree with her  HPI    Review of Systems A complete review of systems is negative other than that noted above in the HPI.      Current Medications[1]  Objective   /66 (BP Location: Left arm, Patient Position: Sitting, Cuff Size: Standard)   Pulse 69   Ht 5' 4\" (1.626 m)   Wt 72.6 kg (160 lb)   LMP  (LMP Unknown)   BMI 27.46 kg/m²     Physical Exam  Vitals and nursing note reviewed.   Constitutional:       General: She is not in acute distress.     Appearance: She is not ill-appearing.   HENT:      Head: Normocephalic and atraumatic.     Eyes:      General: No scleral icterus.     Extraocular Movements: Extraocular movements intact.       Cardiovascular:      Rate and Rhythm: Normal rate and regular rhythm.   Pulmonary:      Effort: Pulmonary effort is normal. No respiratory distress.     Skin:     General: Skin is warm and dry.      Coloration: Skin is not cyanotic.      Findings: No erythema.     Neurological:      General: No " focal deficit present.      Mental Status: She is alert and oriented to person, place, and time.     Psychiatric:         Mood and Affect: Mood normal.         Behavior: Behavior normal.            Lab Results: I personally reviewed relevant lab results.       Results for orders placed during the hospital encounter of 06/12/25    Colonoscopy    Impression  Normal.  Performed forceps biopsies in the cecum, ascending colon, transverse colon, descending colon, sigmoid colon and rectum for dysplasia screening  Diverticulosis in the sigmoid colon        RECOMMENDATION:  Repeat colonoscopy in 3 years, due: 6/11/2028  Inflammatory bowel disease    Continue balsalazide            Agustin eNumann MD             [1]   Current Outpatient Medications   Medication Sig Dispense Refill    acetaminophen (TYLENOL) 500 mg tablet Take 500 mg by mouth every 6 (six) hours (Patient taking differently: Take 500 mg by mouth every 6 (six) hours as needed)      ALPRAZolam (XANAX) 0.25 mg tablet Take 0.25 mg by mouth (Patient taking differently: Take 0.25 mg by mouth daily at bedtime as needed)      aspirin (ECOTRIN LOW STRENGTH) 81 mg EC tablet Take 81 mg by mouth in the morning.      atorvastatin (LIPITOR) 20 mg tablet Take 20 mg by mouth      balsalazide (COLAZAL) 750 mg capsule Take 3 capsules (2,250 mg total) by mouth in the morning and 3 capsules (2,250 mg total) before bedtime. 540 capsule 1    calcium carbonate (OS-JESSIE) 600 MG tablet Take 600 mg by mouth in the morning and 600 mg in the evening. Take with meals.      Cholecalciferol 1000 units capsule Take 1,000 Units by mouth      Cranberry 1000 MG CAPS Take by mouth      diltiazem (CARDIZEM CD) 240 mg 24 hr capsule       fexofenadine (ALLEGRA) 60 MG tablet Take 60 mg by mouth daily      losartan-hydrochlorothiazide (HYZAAR) 100-12.5 MG per tablet       Magnesium 250 MG CAPS Take 1 capsule by mouth in the morning      metoprolol succinate (TOPROL-XL) 25 mg 24 hr tablet Take 25 mg by  mouth in the morning.      multivitamin (THERAGRAN) TABS Take 1 tablet by mouth in the morning.      Omega-3 Fatty Acids (OMEGA-3 FISH OIL) 1000 MG CAPS Take by mouth      potassium chloride (Klor-Con M20) 20 mEq tablet Take 1 tablet by mouth in the morning      Probiotic Product (PROBIOTIC-10 PO) Take by mouth      spironolactone (ALDACTONE) 25 mg tablet       azelastine (ASTELIN) 0.1 % nasal spray 1 spray into each nostril 2 (two) times a day Use in each nostril as directed (Patient not taking: Reported on 6/30/2025) 1 mL 0    calcium carbonate (TUMS) 500 mg chewable tablet Chew 1 tablet in the morning. (Patient taking differently: Chew 1 tablet if needed)      melatonin 3 mg Take by mouth (Patient not taking: Reported on 7/16/2025)      metoprolol tartrate (LOPRESSOR) 50 mg tablet Take 50 mg by mouth       No current facility-administered medications for this visit.

## 2025-07-30 ENCOUNTER — HOSPITAL ENCOUNTER (OUTPATIENT)
Facility: HOSPITAL | Age: 65
Discharge: HOME | End: 2025-07-30
Attending: PHYSICIAN ASSISTANT
Payer: MEDICARE

## 2025-07-30 VITALS — HEIGHT: 64 IN | WEIGHT: 160 LBS | BODY MASS INDEX: 27.31 KG/M2

## 2025-07-30 DIAGNOSIS — Z12.31 ENCOUNTER FOR SCREENING MAMMOGRAM FOR MALIGNANT NEOPLASM OF BREAST: ICD-10-CM

## 2025-07-30 PROCEDURE — 77063 BREAST TOMOSYNTHESIS BI: CPT

## 2025-07-30 PROCEDURE — 77067 SCR MAMMO BI INCL CAD: CPT

## 2025-08-12 ENCOUNTER — OFFICE VISIT (OUTPATIENT)
Dept: URGENT CARE | Facility: CLINIC | Age: 65
End: 2025-08-12
Payer: MEDICARE

## 2025-08-12 ENCOUNTER — APPOINTMENT (EMERGENCY)
Dept: RADIOLOGY | Facility: HOSPITAL | Age: 65
End: 2025-08-12
Payer: MEDICARE

## 2025-08-12 ENCOUNTER — HOSPITAL ENCOUNTER (EMERGENCY)
Facility: HOSPITAL | Age: 65
Discharge: HOME | End: 2025-08-12
Attending: EMERGENCY MEDICINE | Admitting: EMERGENCY MEDICINE
Payer: MEDICARE